# Patient Record
Sex: MALE | Race: WHITE | NOT HISPANIC OR LATINO | Employment: FULL TIME | URBAN - METROPOLITAN AREA
[De-identification: names, ages, dates, MRNs, and addresses within clinical notes are randomized per-mention and may not be internally consistent; named-entity substitution may affect disease eponyms.]

---

## 2017-07-17 ENCOUNTER — COMMUNICATION - HEALTHEAST (OUTPATIENT)
Dept: FAMILY MEDICINE | Facility: CLINIC | Age: 57
End: 2017-07-17

## 2017-07-17 DIAGNOSIS — E78.5 HYPERLIPIDEMIA: ICD-10-CM

## 2017-08-08 ENCOUNTER — COMMUNICATION - HEALTHEAST (OUTPATIENT)
Dept: FAMILY MEDICINE | Facility: CLINIC | Age: 57
End: 2017-08-08

## 2017-09-15 ENCOUNTER — COMMUNICATION - HEALTHEAST (OUTPATIENT)
Dept: TELEHEALTH | Facility: CLINIC | Age: 57
End: 2017-09-15

## 2017-09-15 ENCOUNTER — OFFICE VISIT - HEALTHEAST (OUTPATIENT)
Dept: FAMILY MEDICINE | Facility: CLINIC | Age: 57
End: 2017-09-15

## 2017-09-15 DIAGNOSIS — E06.1 SUBACUTE THYROIDITIS: ICD-10-CM

## 2017-09-15 DIAGNOSIS — Z23 NEED FOR VACCINATION: ICD-10-CM

## 2017-09-15 DIAGNOSIS — Z13.1 SCREENING FOR DIABETES MELLITUS: ICD-10-CM

## 2017-09-15 DIAGNOSIS — E78.00 PURE HYPERCHOLESTEROLEMIA: ICD-10-CM

## 2017-09-15 DIAGNOSIS — Z80.42 FAMILY HISTORY OF PROSTATE CANCER IN FATHER: ICD-10-CM

## 2017-09-15 LAB
CHOLEST SERPL-MCNC: 182 MG/DL
FASTING STATUS PATIENT QL REPORTED: YES
HDLC SERPL-MCNC: 49 MG/DL
LDLC SERPL CALC-MCNC: 114 MG/DL
PSA SERPL-MCNC: 4.7 NG/ML (ref 0–3.5)
TRIGL SERPL-MCNC: 97 MG/DL

## 2017-09-15 ASSESSMENT — MIFFLIN-ST. JEOR: SCORE: 1582.6

## 2017-09-15 NOTE — ASSESSMENT & PLAN NOTE
We will check this patient's cholesterol levels with fasting labs today.  We will run him through the cardiovascular risk algorithm.  We will consider at some point increasing her modifying his treatment to moderate intensity statin.

## 2017-09-20 ENCOUNTER — COMMUNICATION - HEALTHEAST (OUTPATIENT)
Dept: FAMILY MEDICINE | Facility: CLINIC | Age: 57
End: 2017-09-20

## 2017-09-20 DIAGNOSIS — E78.00 PURE HYPERCHOLESTEROLEMIA: ICD-10-CM

## 2017-09-20 DIAGNOSIS — R97.20 ELEVATED PSA: ICD-10-CM

## 2017-09-20 DIAGNOSIS — Z80.42 FAMILY HISTORY OF PROSTATE CANCER IN FATHER: ICD-10-CM

## 2017-09-21 ENCOUNTER — COMMUNICATION - HEALTHEAST (OUTPATIENT)
Dept: FAMILY MEDICINE | Facility: CLINIC | Age: 57
End: 2017-09-21

## 2017-10-20 ENCOUNTER — RECORDS - HEALTHEAST (OUTPATIENT)
Dept: ADMINISTRATIVE | Facility: OTHER | Age: 57
End: 2017-10-20

## 2017-11-16 ENCOUNTER — RECORDS - HEALTHEAST (OUTPATIENT)
Dept: ADMINISTRATIVE | Facility: OTHER | Age: 57
End: 2017-11-16

## 2017-11-20 ENCOUNTER — RECORDS - HEALTHEAST (OUTPATIENT)
Dept: ADMINISTRATIVE | Facility: OTHER | Age: 57
End: 2017-11-20

## 2018-04-26 ENCOUNTER — RECORDS - HEALTHEAST (OUTPATIENT)
Dept: ADMINISTRATIVE | Facility: OTHER | Age: 58
End: 2018-04-26

## 2018-04-27 LAB — PSA SERPL-MCNC: 8.2 NG/ML (ref 0–3.5)

## 2018-05-14 ENCOUNTER — RECORDS - HEALTHEAST (OUTPATIENT)
Dept: ADMINISTRATIVE | Facility: OTHER | Age: 58
End: 2018-05-14

## 2018-08-10 ENCOUNTER — AMBULATORY - HEALTHEAST (OUTPATIENT)
Dept: LAB | Facility: CLINIC | Age: 58
End: 2018-08-10

## 2018-08-10 ENCOUNTER — COMMUNICATION - HEALTHEAST (OUTPATIENT)
Dept: FAMILY MEDICINE | Facility: CLINIC | Age: 58
End: 2018-08-10

## 2018-08-10 DIAGNOSIS — R97.20 ELEVATED PROSTATE SPECIFIC ANTIGEN (PSA): ICD-10-CM

## 2018-08-10 LAB — PSA SERPL-MCNC: 5.8 NG/ML (ref 0–3.5)

## 2018-08-15 ENCOUNTER — COMMUNICATION - HEALTHEAST (OUTPATIENT)
Dept: FAMILY MEDICINE | Facility: CLINIC | Age: 58
End: 2018-08-15

## 2018-09-10 ENCOUNTER — COMMUNICATION - HEALTHEAST (OUTPATIENT)
Dept: FAMILY MEDICINE | Facility: CLINIC | Age: 58
End: 2018-09-10

## 2018-09-10 DIAGNOSIS — E78.00 PURE HYPERCHOLESTEROLEMIA: ICD-10-CM

## 2018-10-05 ENCOUNTER — OFFICE VISIT - HEALTHEAST (OUTPATIENT)
Dept: FAMILY MEDICINE | Facility: CLINIC | Age: 58
End: 2018-10-05

## 2018-10-05 ENCOUNTER — AMBULATORY - HEALTHEAST (OUTPATIENT)
Dept: LAB | Facility: CLINIC | Age: 58
End: 2018-10-05

## 2018-10-05 DIAGNOSIS — N52.9 VASCULOGENIC ERECTILE DYSFUNCTION, UNSPECIFIED VASCULOGENIC ERECTILE DYSFUNCTION TYPE: ICD-10-CM

## 2018-10-05 DIAGNOSIS — Z80.42 FAMILY HISTORY OF PROSTATE CANCER IN FATHER: ICD-10-CM

## 2018-10-05 DIAGNOSIS — Z13.1 SCREENING FOR DIABETES MELLITUS: ICD-10-CM

## 2018-10-05 DIAGNOSIS — Z00.00 ROUTINE GENERAL MEDICAL EXAMINATION AT A HEALTH CARE FACILITY: ICD-10-CM

## 2018-10-05 DIAGNOSIS — N40.1 BENIGN PROSTATIC HYPERPLASIA WITH LOWER URINARY TRACT SYMPTOMS, SYMPTOM DETAILS UNSPECIFIED: ICD-10-CM

## 2018-10-05 DIAGNOSIS — E78.00 PURE HYPERCHOLESTEROLEMIA: ICD-10-CM

## 2018-10-05 DIAGNOSIS — E06.1 SUBACUTE THYROIDITIS: ICD-10-CM

## 2018-10-05 LAB
CHOLEST SERPL-MCNC: 181 MG/DL
FASTING STATUS PATIENT QL REPORTED: YES
FASTING STATUS PATIENT QL REPORTED: YES
GLUCOSE BLD-MCNC: 85 MG/DL (ref 70–99)
HDLC SERPL-MCNC: 49 MG/DL
LDLC SERPL CALC-MCNC: 109 MG/DL
TRIGL SERPL-MCNC: 114 MG/DL
TSH SERPL DL<=0.005 MIU/L-ACNC: 3.45 UIU/ML (ref 0.3–5)

## 2018-10-05 ASSESSMENT — MIFFLIN-ST. JEOR: SCORE: 1599.94

## 2018-10-05 NOTE — ASSESSMENT & PLAN NOTE
Recheck fasting lipid levels today.  We will plan to continue simvastatin at current dose and less there is a dramatic increase in his cholesterol.

## 2018-10-05 NOTE — ASSESSMENT & PLAN NOTE
The patient continues to follow with Dr. Murray through [place] urology.  Recent PSA has improved relative to previous.  Orders are through his urologist.

## 2018-10-08 ENCOUNTER — COMMUNICATION - HEALTHEAST (OUTPATIENT)
Dept: FAMILY MEDICINE | Facility: CLINIC | Age: 58
End: 2018-10-08

## 2019-04-26 ENCOUNTER — AMBULATORY - HEALTHEAST (OUTPATIENT)
Dept: LAB | Facility: CLINIC | Age: 59
End: 2019-04-26

## 2019-04-26 DIAGNOSIS — R97.20 ELEVATED PROSTATE SPECIFIC ANTIGEN (PSA): ICD-10-CM

## 2019-04-26 LAB — PSA SERPL-MCNC: 5.8 NG/ML (ref 0–3.5)

## 2019-04-29 ENCOUNTER — COMMUNICATION - HEALTHEAST (OUTPATIENT)
Dept: FAMILY MEDICINE | Facility: CLINIC | Age: 59
End: 2019-04-29

## 2019-11-12 ENCOUNTER — COMMUNICATION - HEALTHEAST (OUTPATIENT)
Dept: FAMILY MEDICINE | Facility: CLINIC | Age: 59
End: 2019-11-12

## 2019-11-12 DIAGNOSIS — E78.00 PURE HYPERCHOLESTEROLEMIA: ICD-10-CM

## 2020-02-06 ENCOUNTER — COMMUNICATION - HEALTHEAST (OUTPATIENT)
Dept: FAMILY MEDICINE | Facility: CLINIC | Age: 60
End: 2020-02-06

## 2020-02-06 DIAGNOSIS — E78.00 PURE HYPERCHOLESTEROLEMIA: ICD-10-CM

## 2020-02-06 NOTE — ASSESSMENT & PLAN NOTE
Clinic visit required before additional refills.  Patient has not been seen since 2018.  90-day supply sent to pharmacy.

## 2020-02-21 ENCOUNTER — OFFICE VISIT - HEALTHEAST (OUTPATIENT)
Dept: FAMILY MEDICINE | Facility: CLINIC | Age: 60
End: 2020-02-21

## 2020-02-21 DIAGNOSIS — E06.1 SUBACUTE THYROIDITIS: ICD-10-CM

## 2020-02-21 DIAGNOSIS — E78.00 PURE HYPERCHOLESTEROLEMIA: ICD-10-CM

## 2020-02-21 DIAGNOSIS — Z11.4 SCREENING FOR HIV WITHOUT PRESENCE OF RISK FACTORS: ICD-10-CM

## 2020-02-21 DIAGNOSIS — Z11.59 ENCOUNTER FOR HEPATITIS C SCREENING TEST FOR LOW RISK PATIENT: ICD-10-CM

## 2020-02-21 DIAGNOSIS — Z13.1 SCREENING FOR DIABETES MELLITUS: ICD-10-CM

## 2020-02-21 DIAGNOSIS — Z00.00 ROUTINE GENERAL MEDICAL EXAMINATION AT A HEALTH CARE FACILITY: ICD-10-CM

## 2020-02-21 DIAGNOSIS — Z80.42 FAMILY HISTORY OF PROSTATE CANCER IN FATHER: ICD-10-CM

## 2020-02-21 LAB
ALT SERPL W P-5'-P-CCNC: 26 U/L (ref 0–45)
ANION GAP SERPL CALCULATED.3IONS-SCNC: 9 MMOL/L (ref 5–18)
BUN SERPL-MCNC: 20 MG/DL (ref 8–22)
CALCIUM SERPL-MCNC: 9.2 MG/DL (ref 8.5–10.5)
CHLORIDE BLD-SCNC: 103 MMOL/L (ref 98–107)
CHOLEST SERPL-MCNC: 165 MG/DL
CO2 SERPL-SCNC: 27 MMOL/L (ref 22–31)
CREAT SERPL-MCNC: 0.9 MG/DL (ref 0.7–1.3)
FASTING STATUS PATIENT QL REPORTED: YES
GFR SERPL CREATININE-BSD FRML MDRD: >60 ML/MIN/1.73M2
GLUCOSE BLD-MCNC: 83 MG/DL (ref 70–125)
HDLC SERPL-MCNC: 43 MG/DL
HIV 1+2 AB+HIV1 P24 AG SERPL QL IA: NEGATIVE
LDLC SERPL CALC-MCNC: 104 MG/DL
POTASSIUM BLD-SCNC: 4.1 MMOL/L (ref 3.5–5)
SODIUM SERPL-SCNC: 139 MMOL/L (ref 136–145)
TRIGL SERPL-MCNC: 91 MG/DL
TSH SERPL DL<=0.005 MIU/L-ACNC: 1.98 UIU/ML (ref 0.3–5)

## 2020-02-21 ASSESSMENT — MIFFLIN-ST. JEOR: SCORE: 1648.14

## 2020-02-21 NOTE — ASSESSMENT & PLAN NOTE
Doing well.  Physically active.  He will continue to see his urologist.  Some post void dribbling.  Check lipids and labs today.  Screening for hepatitis C and HIV.

## 2020-02-24 LAB — HCV AB SERPL QL IA: NEGATIVE

## 2020-02-26 ENCOUNTER — RECORDS - HEALTHEAST (OUTPATIENT)
Dept: ADMINISTRATIVE | Facility: OTHER | Age: 60
End: 2020-02-26

## 2020-04-14 ENCOUNTER — COMMUNICATION - HEALTHEAST (OUTPATIENT)
Dept: FAMILY MEDICINE | Facility: CLINIC | Age: 60
End: 2020-04-14

## 2020-04-14 DIAGNOSIS — E78.00 PURE HYPERCHOLESTEROLEMIA: ICD-10-CM

## 2020-07-18 ENCOUNTER — COMMUNICATION - HEALTHEAST (OUTPATIENT)
Dept: FAMILY MEDICINE | Facility: CLINIC | Age: 60
End: 2020-07-18

## 2020-07-18 DIAGNOSIS — E78.00 PURE HYPERCHOLESTEROLEMIA: ICD-10-CM

## 2021-04-12 ENCOUNTER — COMMUNICATION - HEALTHEAST (OUTPATIENT)
Dept: FAMILY MEDICINE | Facility: CLINIC | Age: 61
End: 2021-04-12

## 2021-04-12 DIAGNOSIS — E78.00 PURE HYPERCHOLESTEROLEMIA: ICD-10-CM

## 2021-04-13 RX ORDER — SIMVASTATIN 40 MG
TABLET ORAL
Qty: 90 TABLET | Refills: 0 | Status: SHIPPED | OUTPATIENT
Start: 2021-04-13 | End: 2021-07-29

## 2021-04-19 ENCOUNTER — AMBULATORY - HEALTHEAST (OUTPATIENT)
Dept: NURSING | Facility: CLINIC | Age: 61
End: 2021-04-19

## 2021-05-10 ENCOUNTER — AMBULATORY - HEALTHEAST (OUTPATIENT)
Dept: NURSING | Facility: CLINIC | Age: 61
End: 2021-05-10

## 2021-05-25 ENCOUNTER — RECORDS - HEALTHEAST (OUTPATIENT)
Dept: ADMINISTRATIVE | Facility: CLINIC | Age: 61
End: 2021-05-25

## 2021-05-31 VITALS — HEIGHT: 69 IN | WEIGHT: 175.3 LBS | BODY MASS INDEX: 25.96 KG/M2

## 2021-06-02 VITALS — WEIGHT: 180 LBS | HEIGHT: 68 IN | BODY MASS INDEX: 27.28 KG/M2

## 2021-06-03 NOTE — TELEPHONE ENCOUNTER
RN cannot approve Refill Request    RN can NOT refill this medication PCP messaged that patient is overdue for Office Visit. Last office visit: 9/15/2017 Pipo Helm MD Last Physical: 10/5/2018 Last MTM visit: Visit date not found Last visit same specialty: 9/15/2017 Pipo Helm MD.  Next visit within 3 mo: Visit date not found  Next physical within 3 mo: Visit date not found      Shelia Mari, [facility] Triage/Med Refill 11/12/2019    Requested Prescriptions   Pending Prescriptions Disp Refills     simvastatin (ZOCOR) 40 MG tablet [Pharmacy Med Name: SIMVASTATIN 40 MG TABLET] 90 tablet 3     Sig: TAKE 1 TABLET BY MOUTH EVERYDAY AT BEDTIME       Statins Refill Protocol (Hmg CoA Reductase Inhibitors) Failed - 11/12/2019  2:01 AM        Failed - PCP or prescribing provider visit in past 12 months      Last office visit with prescriber/PCP: 9/15/2017 Pipo Helm MD OR same dept: Visit date not found OR same specialty: 9/15/2017 Pipo Helm MD  Last physical: 10/5/2018 Last MTM visit: Visit date not found   Next visit within 3 mo: Visit date not found  Next physical within 3 mo: Visit date not found  Prescriber OR PCP: Pipo Helm MD  Last diagnosis associated with med order: 1. Pure hypercholesterolemia  - simvastatin (ZOCOR) 40 MG tablet [Pharmacy Med Name: SIMVASTATIN 40 MG TABLET]; TAKE 1 TABLET BY MOUTH EVERYDAY AT BEDTIME  Dispense: 90 tablet; Refill: 3    If protocol passes may refill for 12 months if within 3 months of last provider visit (or a total of 15 months).

## 2021-06-04 VITALS
RESPIRATION RATE: 12 BRPM | DIASTOLIC BLOOD PRESSURE: 60 MMHG | SYSTOLIC BLOOD PRESSURE: 116 MMHG | HEIGHT: 69 IN | HEART RATE: 64 BPM | WEIGHT: 188 LBS | TEMPERATURE: 97.6 F | BODY MASS INDEX: 27.85 KG/M2 | OXYGEN SATURATION: 98 %

## 2021-06-05 NOTE — TELEPHONE ENCOUNTER
Pure hypercholesterolemia  Clinic visit required before additional refills.  Patient has not been seen since 2018.  90-day supply sent to pharmacy.

## 2021-06-05 NOTE — TELEPHONE ENCOUNTER
RN cannot approve Refill Request    RN can NOT refill this medication Protocol failed and NO refill given.      Megan Mishra, [facility] Triage/Med Refill 2/6/2020    Requested Prescriptions   Pending Prescriptions Disp Refills     simvastatin (ZOCOR) 40 MG tablet [Pharmacy Med Name: SIMVASTATIN 40 MG TABLET] 90 tablet 3     Sig: TAKE 1 TABLET BY MOUTH EVERYDAY AT BEDTIME       Statins Refill Protocol (Hmg CoA Reductase Inhibitors) Failed - 2/6/2020  4:33 AM        Failed - PCP or prescribing provider visit in past 12 months      Last office visit with prescriber/PCP: 9/15/2017 Pipo Helm MD OR same dept: Visit date not found OR same specialty: 9/15/2017 Pipo Helm MD  Last physical: 10/5/2018 Last MTM visit: Visit date not found   Next visit within 3 mo: Visit date not found  Next physical within 3 mo: Visit date not found  Prescriber OR PCP: Pipo Helm MD  Last diagnosis associated with med order: 1. Pure hypercholesterolemia  - simvastatin (ZOCOR) 40 MG tablet [Pharmacy Med Name: SIMVASTATIN 40 MG TABLET]; TAKE 1 TABLET BY MOUTH EVERYDAY AT BEDTIME  Dispense: 90 tablet; Refill: 0    If protocol passes may refill for 12 months if within 3 months of last provider visit (or a total of 15 months).

## 2021-06-07 NOTE — TELEPHONE ENCOUNTER
Refill Request  Did you contact pharmacy: Yes, needs to change pharmacy to [facility] pharmacy in [place]  Medication name:   simvastatin (ZOCOR) 40 MG tablet  90 tablet  0  2/6/2020      Sig: TAKE 1 TABLET BY MOUTH EVERYDAY AT BEDTIME     Sent to pharmacy as: simvastatin 40 mg tablet (ZOCOR)     E-Prescribing Status: Receipt confirmed by pharmacy (2/6/2020  9:47 AM CST)         Who prescribed the medication: Pipo Helm MD   Requested Pharmacy: [facility] in [place]  Is patient out of medication: Yes  Patient notified refills processed in 3 business days:  yes  Okay to leave a detailed message: yes

## 2021-06-09 NOTE — TELEPHONE ENCOUNTER
Refill Approved    Rx renewed per Medication Renewal Policy. Medication was last renewed on 04/15/2020.  Last office visit was 02/21/2020 with PCP.    Cait Leon, [facility] Triage/Med Refill 7/18/2020     Requested Prescriptions   Pending Prescriptions Disp Refills     simvastatin (ZOCOR) 40 MG tablet [Pharmacy Med Name: Simvastatin Oral Tablet 40 MG] 90 tablet 0     Sig: TAKE 1 TABLET BY MOUTH EVERYDAY AT BEDTIME       Statins Refill Protocol (Hmg CoA Reductase Inhibitors) Passed - 7/18/2020  8:26 AM        Passed - PCP or prescribing provider visit in past 12 months      Last office visit with prescriber/PCP: 9/15/2017 Pipo Helm MD OR same dept: Visit date not found OR same specialty: 9/15/2017 Pipo Helm MD  Last physical: 2/21/2020 Last MTM visit: Visit date not found   Next visit within 3 mo: Visit date not found  Next physical within 3 mo: Visit date not found  Prescriber OR PCP: Pipo Helm MD  Last diagnosis associated with med order: 1. Pure hypercholesterolemia  - simvastatin (ZOCOR) 40 MG tablet [Pharmacy Med Name: Simvastatin Oral Tablet 40 MG]; TAKE 1 TABLET BY MOUTH EVERYDAY AT BEDTIME  Dispense: 90 tablet; Refill: 0    If protocol passes may refill for 12 months if within 3 months of last provider visit (or a total of 15 months).

## 2021-06-13 NOTE — PROGRESS NOTES
Assessment/Plan:    Problem List Items Addressed This Visit        ENT/CARD/PULM/ENDO Problems    Pure hypercholesterolemia     We will check this patient's cholesterol levels with fasting labs today.  We will run him through the cardiovascular risk algorithm.  We will consider at some point increasing her modifying his treatment to moderate intensity statin.         Relevant Orders    Lipid Profile    Subacute thyroiditis - Primary     Asymptomatic.  The patient did unexpectedly lose weight.  Will check a thyroid level today.         Relevant Orders    Thyroid Almo       Other    Family history of prostate cancer in father     We will continue the practice of following his PSA.  No new lower urinary tract symptoms.         Relevant Orders    PSA, Annual Screen (Prostatic-Specific Antigen)      Other Visit Diagnoses     Need for vaccination        Screening for diabetes mellitus        Relevant Orders    Glucose        Greater than 25 minutes of total time was spent with patient of which greater than 50% was spent on counseling and coordination of care.        Pipo Helm MD  _______________________________    Chief Complaint   Patient presents with     Establish Care     Med Check     Subjective: Jamel Hernandez is a 57 y.o. year old male who returns to clinic for the following chronic complaints/concerns:     Establish care:   -This patient is here for medication check and to establish care.  He is a long-time patient of Dr. Varner and has not been seen in clinic for over a year since Dr. Varner's senior living.  He has been on simvastatin for a number of years without side effects.  He has a distant history of thyroiditis which reportedly resolved.  He otherwise feels well.  He denies chest pain, chest tightness, shortness of breath, difficulty breathing.  Overall, the patient feels energetic.  He has lost some weight over the course of the summer which he attributes to bicycle riding although he  "was surprised by the measurement.  No other concerns today.    Review of systems is negative except for as shown in the HPI.    The following portions of the patient's history were reviewed and updated as appropriate: allergies, current medications, past family history, past medical history, past social history, past surgical history and problem list.    Objective:    height is 5' 8.5\" (1.74 m) and weight is 175 lb 4.8 oz (79.5 kg). His blood pressure is 132/80 and his pulse is 76. His respiration is 14.   General: No acute distress  ENT: No submandibular anterior cervical lymphadenopathy.  No thyromegaly.  Cardiac: Regular rate and rhythm, normal S1/S2, no murmurs rubs gallops  Respiratory: Clear to auscultation bilaterally.  Abdomen: Soft, nontender, nondistended.  Psych: Normal affect.  Normal rate of speech.  No tangentiality.    No results found for this or any previous visit (from the past 24 hour(s)).  The patient and I reviewed his laboratory tests over the past year.  His PSA was 3.8 a couple of years ago.  He does have a family history of prostate cancer in his father and his grandfather.  The patient's lipid levels have slowly been increasing.  It appears that his diagnosis was in 2012 with a very mild elevation.  The patient's colonoscopy was completed in 2016.  Is a 10 year repeat interval.    Additional History from Old Records Summarized (2): no  Decision to Obtain Records (1): no  Radiology Tests Summarized or Ordered (1): no  Labs Reviewed or Ordered (1): yes  Medicine Test Summarized or Ordered (1): yes  Independent Review of EKG or X-RAY(2 each): no    This note has been dictated using voice recognition software. Any grammatical or context distortions are unintentional and inherent to the software  "

## 2021-06-16 PROBLEM — Z80.42 FAMILY HISTORY OF PROSTATE CANCER IN FATHER: Status: ACTIVE | Noted: 2017-09-15

## 2021-06-16 PROBLEM — Z00.00 ROUTINE GENERAL MEDICAL EXAMINATION AT A HEALTH CARE FACILITY: Status: ACTIVE | Noted: 2020-02-21

## 2021-06-16 PROBLEM — N52.9 VASCULOGENIC ERECTILE DYSFUNCTION: Status: ACTIVE | Noted: 2018-10-05

## 2021-06-19 NOTE — LETTER
Letter by Pipo Helm MD at      Author: Pipo Helm MD Service: -- Author Type: --    Filed:  Encounter Date: 11/12/2019 Status: Signed       Jamel Hernandez   [address] [facility] [address]      11/12/2019       Dear Jamel,       In reviewing your records, we have determined a gap in your preventive services. Based on your age and health history, we recommend the follow service.     ? General Physical        If you have had the service elsewhere, please contact us so we can update our records. Please let us know if you have transferred your care to another clinic.    Please call [phone] to schedule this appointment.    We believe that a strong preventive care program, including regular physicals and follow-up care is an important part of a healthy lifestyle and we are committed to helping you maintain your health.    Thank you for choosing us as your health care provider.    Sincerely,     [facility]            
Intractable abdominal pain

## 2021-06-19 NOTE — LETTER
Letter by Pipo Helm MD at      Author: Pipo Helm MD Service: -- Author Type: --    Filed:  Encounter Date: 4/29/2019 Status: (Other)         Jamel Hernandez  [address] [facility] [address]             April 29, 2019         Dear Mr. Hernandez,    Below are the results from your recent visit:    Resulted Orders   PSA, Diagnostic (Prostatic-Specific Antigen)   Result Value Ref Range    PSA 5.8 (H) 0.0 - 3.5 ng/mL    Narrative    Method is Abbott Prostate-Specific Antigen (PSA)  Standard-WHO 1st International (90:10)       This result was forwarded to your urologist.    Please call with questions or contact us using [place].    Sincerely,        Electronically signed by Pipo Helm MD

## 2021-06-20 NOTE — PROGRESS NOTES
MALE PREVENTATIVE EXAM    Assessment and Plan:       Problem List Items Addressed This Visit        ENT/CARD/PULM/ENDO Problems    Pure hypercholesterolemia     Recheck fasting lipid levels today.  We will plan to continue simvastatin at current dose and less there is a dramatic increase in his cholesterol.         Relevant Medications    simvastatin (ZOCOR) 40 MG tablet    Other Relevant Orders    Lipid Profile    Subacute thyroiditis     Recheck TSH.  If normal, discontinue annual measurement unless he becomes symptomatic.         Relevant Orders    Thyroid Edwards       Other    Family history of prostate cancer in father     The patient continues to follow with Dr. Murray through [place] urology.  Recent PSA has improved relative to previous.  Orders are through his urologist.         Vasculogenic erectile dysfunction     Continue sildenafil.  This is currently being prescribed through his urologist.           Other Visit Diagnoses     Routine general medical examination at a health care facility    -  Primary    Relevant Orders    Lipid Profile    Glucose (Completed)    Thyroid Edwards    Benign prostatic hyperplasia with lower urinary tract symptoms, symptom details unspecified        Screening for diabetes mellitus        Relevant Orders    Glucose (Completed)        Next follow up:  Return in about 1 year (around 10/5/2019) for Annual physical.    Immunization Review  Adult Imm Review: Due today, orders placed flu shot   Pt does not use tobacco products   I discussed the following with the patient:   Adult Healthy Living: Importance of regular exercise  Healthy nutrition  Herbal medications/alternative medical therapies    I have had an Advance Directives discussion with the patient.    Subjective:   Chief Complaint: Jamel Hernandez is an 58 y.o. male here for a preventative health visit.     HPI:  Exercise.  More bike riding this past summer.  Planning to cross country ski.  Stamina great.   "Continue to follow-up     Healthy Habits  Are you taking a daily aspirin? No  Do you typically exercising at least 40 min, 3-4 times per week?  Yes  Do you usually eat at least 4 servings of fruit and vegetables a day, include whole grains and fiber and avoid regularly eating high fat foods? NO  Have you had an eye exam in the past two years? NO  Do you see a dentist twice per year? Yes  Do you have any concerns regarding sleep? No    Safety Screen  If you own firearms, are they secured in a locked gun cabinet or with trigger locks? The patient does not own any firearms  Do you feel you are safe where you are living?: Yes (10/5/2018 11:22 AM)  Do you feel you are safe in your relationship(s)?: Yes (10/5/2018 11:22 AM)    Review of Systems:  Please see above.  The rest of the review of systems are negative for all systems.     Cancer Screening       Status Date      COLONOSCOPY Next Due 6/6/2026      Done 6/6/2016 COLONOSCOPY EXTERNAL RESULT        Patient Care Team:  Pipo Helm MD as PCP - General (Family Medicine)    History     Reviewed By Date/Time Sections Reviewed    Pipo Helm MD 10/5/2018 11:31 AM Medical, Surgical, Tobacco, Alcohol, Drug Use, Sexual Activity, Family    Jewels Richardson LPN 10/5/2018 11:22 AM Tobacco    Jewels Richardson LPN 10/5/2018 11:19 AM Surgical, Tobacco, Family    Jewels Richardson LPN 10/5/2018 11:18 AM Surgical, Tobacco, Alcohol, Drug Use, Sexual Activity, Family          Objective:   Vital Signs:   Visit Vitals     /78 (Patient Site: Left Arm, Patient Position: Sitting, Cuff Size: Adult Regular)     Pulse 60     Temp 97.6  F (36.4  C) (Oral)     Resp 20     Ht 5' 8.25\" (1.734 m)     Wt 180 lb (81.6 kg)     SpO2 98%  Comment: room air     BMI 27.17 kg/m2       PHYSICAL EXAM  Physical Exam   Constitutional: He is oriented to person, place, and time. He appears well-developed. No distress.   HENT:   Head: Normocephalic and atraumatic.   Right " Ear: External ear normal.   Left Ear: External ear normal.   Nose: Nose normal.   Mouth/Throat: Oropharynx is clear and moist. No oropharyngeal exudate.   Eyes: Conjunctivae and EOM are normal. Pupils are equal, round, and reactive to light. Right eye exhibits no discharge. Left eye exhibits no discharge. No scleral icterus.   Neck: Normal range of motion. No thyromegaly present.   Cardiovascular: Normal rate, regular rhythm, normal heart sounds and intact distal pulses.  Exam reveals no gallop and no friction rub.    No murmur heard.  Pulmonary/Chest: Effort normal and breath sounds normal. No respiratory distress. He has no wheezes.   Abdominal: Soft. He exhibits no distension and no mass. There is no tenderness.   Musculoskeletal: Normal range of motion. He exhibits no edema.   Lymphadenopathy:     He has no cervical adenopathy.   Neurological: He is alert and oriented to person, place, and time. He has normal reflexes. No cranial nerve deficit. He exhibits normal muscle tone.   Skin: Skin is warm.   Psychiatric: He has a normal mood and affect. His behavior is normal. Judgment and thought content normal.   Vitals reviewed.    The 10-year ASCVD risk score ([place] DC Jr, et al., 2013) is: 7.3%    Values used to calculate the score:      Age: 58 years      Sex: Male      Is Non- : No      Diabetic: No      Tobacco smoker: No      Systolic Blood Pressure: 134 mmHg      Is BP treated: No      HDL Cholesterol: 49 mg/dL      Total Cholesterol: 182 mg/dL         Medication List          These changes are accurate as of 10/5/18  1:41 PM.  If you have any questions, ask your nurse or doctor.               CHANGE how you take these medications          simvastatin 40 MG tablet   Also known as:  ZOCOR   INSTRUCTIONS:  Take 1 tablet (40 mg total) by mouth at bedtime.   What changed:  See the new instructions.   Changed by:  Pipo Helm MD             CONTINUE taking these medications           sildenafil (antihypertensive) 20 mg tablet   Also known as:  REVATIO   INSTRUCTIONS:  Take 5 tablets (100 mg total) by mouth as needed. 1 hour (range 0.5 to 4 hours) prior to sexual activity                Where to Get Your Medications      These medications were sent to [place] [id number] [place] [facility] [place], [place] - [address]  [address] [facility] [id number]     Phone:  [phone]      simvastatin 40 MG tablet             Additional Screenings Completed Today:

## 2021-06-28 NOTE — PROGRESS NOTES
Progress Notes by Pipo Helm MD at 2/21/2020  1:40 PM     Author: Pipo Helm MD Service: -- Author Type: Physician    Filed: 2/21/2020  2:27 PM Encounter Date: 2/21/2020 Status: Signed    : Pipo Helm MD (Physician)       MALE PREVENTATIVE EXAM    Assessment and Plan:       Problem List Items Addressed This Visit     Pure hypercholesterolemia    Relevant Orders    Lipid Profile    Basic Metabolic Panel    ALT (SGPT)    BMI 27.0-27.9,adult    Relevant Orders    Thyroid Stimulating Hormone (TSH)    Subacute thyroiditis    Relevant Orders    Thyroid Stimulating Hormone (TSH)    Family history of prostate cancer in father    Routine general medical examination at a health care facility - Primary     Doing well.  Physically active.  He will continue to see his urologist.  Some post void dribbling.  Check lipids and labs today.  Screening for hepatitis C and HIV.           Relevant Orders    Thyroid Stimulating Hormone (TSH)      Other Visit Diagnoses     Screening for HIV without presence of risk factors        Relevant Orders    HIV Antigen/Antibody Screening Cascade    Encounter for hepatitis C screening test for low risk patient        Relevant Orders    Hepatitis C Antibody (Anti-HCV)    Screening for diabetes mellitus        Relevant Orders    Glucose          Next follow up:  Return in about 1 year (around 2/21/2021) for Annual physical.    Immunization Review  Adult Imm Review: No immunizations due today  Pt does not use tobacco products     I discussed the following with the patient:   Adult Healthy Living: Importance of regular exercise  Healthy nutrition  Getting adequate sleep  Stress management  Herbal medications/alternative medical therapies    I have had an Advance Directives discussion with the patient.    Subjective:   Chief Complaint: Jamel Hernandez is an 59 y.o. male here for a preventative health visit.     HPI:      Continues to mountain bike and cross country ski.   "Continues to work long hours at work.  Enjoys swimming.      Healthy Habits  Are you taking a daily aspirin? No  Do you typically exercising at least 40 min, 3-4 times per week?  Yes  Do you usually eat at least 4 servings of fruit and vegetables a day, include whole grains and fiber and avoid regularly eating high fat foods? NO.  He says that he needs to have a \"better diet.\"  Some overeating.  He eats 2+ per day.  He eats an early lunch.   Have you had an eye exam in the past two years? Yes  Do you see a dentist twice per year? Yes  Do you have any concerns regarding sleep? No    Safety Screen  If you own firearms, are they secured in a locked gun cabinet or with trigger locks? The patient does not own any firearms  Do you feel you are safe where you are living?: Yes (2/21/2020  1:45 PM)  Do you feel you are safe in your relationship(s)?: Yes (2/21/2020  1:45 PM)      Review of Systems:  Please see above.  The rest of the review of systems are negative for all systems.     Cancer Screening       Status Date      COLONOSCOPY Next Due 6/6/2026      Done 6/6/2016 COLONOSCOPY EXTERNAL RESULT          Patient Care Team:  Pipo Helm MD as PCP - General (Family Medicine)  Pipo Helm MD as Assigned PCP  Ok Taveras MD as Physician (Urology)        History     Reviewed By Date/Time Sections Reviewed    Pipo Helm MD 2/21/2020  2:12 PM Surgical, Pipo Gil MD 2/21/2020  2:09 PM Medical, Surgical, Tobacco, Jewels Ryan LPN 2/21/2020  1:49 PM Jewels Ryan LPN 2/21/2020  1:48 PM Surgical, Tobacco, Alcohol, Drug Use, Sexual Activity, Jewels Ryan LPN 2/21/2020  1:47 PM Jewels Irizarry LPN 2/21/2020  1:45 PM Jewels Irizarry LPN 2/21/2020  1:44 PM Tobacco          Objective:   Vital Signs:   Visit Vitals  /60 (Patient Site: Left Arm, Patient Position: Sitting, Cuff Size: " "Adult Large)   Pulse 64   Temp 97.6  F (36.4  C) (Oral)   Resp 12   Ht 5' 9\" (1.753 m) Comment: with shoes   Wt 188 lb (85.3 kg)   SpO2 98% Comment: room air   BMI 27.76 kg/m      PHYSICAL EXAM  Physical Exam  Vitals signs reviewed.   Constitutional:       General: He is not in acute distress.     Appearance: He is well-developed.   HENT:      Head: Normocephalic and atraumatic.      Right Ear: External ear normal.      Left Ear: External ear normal.      Nose: Nose normal.      Mouth/Throat:      Pharynx: No oropharyngeal exudate.   Eyes:      General: No scleral icterus.        Right eye: No discharge.         Left eye: No discharge.      Conjunctiva/sclera: Conjunctivae normal.      Pupils: Pupils are equal, round, and reactive to light.   Neck:      Musculoskeletal: Normal range of motion.      Thyroid: No thyromegaly.   Cardiovascular:      Rate and Rhythm: Normal rate and regular rhythm.      Heart sounds: Normal heart sounds. No murmur. No friction rub. No gallop.    Pulmonary:      Effort: Pulmonary effort is normal. No respiratory distress.      Breath sounds: Normal breath sounds. No wheezing.   Abdominal:      General: There is no distension.      Palpations: Abdomen is soft. There is no mass.      Tenderness: There is no abdominal tenderness.   Musculoskeletal: Normal range of motion.   Lymphadenopathy:      Cervical: No cervical adenopathy.   Skin:     General: Skin is warm.   Neurological:      Mental Status: He is alert and oriented to person, place, and time.      Cranial Nerves: No cranial nerve deficit.      Motor: No abnormal muscle tone.      Deep Tendon Reflexes: Reflexes are normal and symmetric.   Psychiatric:         Behavior: Behavior normal.         Thought Content: Thought content normal.         Judgment: Judgment normal.       The 10-year ASCVD risk score (Antonellatommy NICHOLE Jr., et al., 2013) is: 6.2%    Values used to calculate the score:      Age: 59 years      Sex: Male      Is Non- " : No      Diabetic: No      Tobacco smoker: No      Systolic Blood Pressure: 116 mmHg      Is BP treated: No      HDL Cholesterol: 49 mg/dL      Total Cholesterol: 181 mg/dL         Medication List          Accurate as of February 21, 2020  2:27 PM. If you have any questions, ask your nurse or doctor.            CONTINUE taking these medications    simvastatin 40 MG tablet  Also known as:  ZOCOR  INSTRUCTIONS:  TAKE 1 TABLET BY MOUTH EVERYDAY AT BEDTIME           STOP taking these medications    sildenafil 20 mg tablet  Also known as:  REVATIO  Stopped by:  Pipo Helm MD            Additional Screenings Completed Today:

## 2021-07-03 NOTE — ADDENDUM NOTE
Addendum Note by Hallie Jennings CMA at 2/19/2016  5:03 PM     Author: Hallie Jennings CMA Service: -- Author Type: Certified Medical Assistant    Filed: 2/26/2020  3:47 PM Encounter Date: 2/19/2016 Status: Signed    : Hallie Jennings CMA (Certified Medical Assistant)    Addended by: HALLIE JENNINGS on: 2/26/2020 03:47 PM        Modules accepted: Orders

## 2021-07-03 NOTE — ADDENDUM NOTE
Addendum Note by Hallie Jennings CMA at 4/26/2018  8:44 AM     Author: Hallie Jennings CMA Service: -- Author Type: Certified Medical Assistant    Filed: 4/26/2018  8:44 AM Encounter Date: 2/19/2016 Status: Signed    : Hallie Jennings CMA (Certified Medical Assistant)    Addended by: HALLIE JENNINGS on: 4/26/2018 08:44 AM        Modules accepted: Orders

## 2021-07-29 ENCOUNTER — MYC REFILL (OUTPATIENT)
Dept: FAMILY MEDICINE | Facility: CLINIC | Age: 61
End: 2021-07-29

## 2021-07-29 DIAGNOSIS — E78.00 PURE HYPERCHOLESTEROLEMIA: ICD-10-CM

## 2021-08-01 ENCOUNTER — HEALTH MAINTENANCE LETTER (OUTPATIENT)
Age: 61
End: 2021-08-01

## 2021-08-03 RX ORDER — SIMVASTATIN 40 MG
40 TABLET ORAL AT BEDTIME
Qty: 90 TABLET | Refills: 0 | Status: SHIPPED | OUTPATIENT
Start: 2021-08-03 | End: 2023-03-03

## 2021-08-03 NOTE — TELEPHONE ENCOUNTER
"Routing refill request to provider for review/approval because:  Labs not current:  LDL  Patient needs to be seen because it has been more than 1 year since last office visit.    Last Written Prescription Date:  4/13/21  Last Fill Quantity: 90,  # refills: 0   Last office visit provider:  2/21/20     Requested Prescriptions   Pending Prescriptions Disp Refills     simvastatin (ZOCOR) 40 MG tablet 90 tablet 0       Statins Protocol Failed - 7/29/2021  6:22 PM        Failed - LDL on file in past 12 months     Recent Labs   Lab Test 02/21/20  1355                Failed - Recent (12 mo) or future (30 days) visit within the authorizing provider's specialty     Patient has had an office visit with the authorizing provider or a provider within the authorizing providers department within the previous 12 mos or has a future within next 30 days. See \"Patient Info\" tab in inbasket, or \"Choose Columns\" in Meds & Orders section of the refill encounter.              Passed - No abnormal creatine kinase in past 12 months     No lab results found.             Passed - Medication is active on med list        Passed - Patient is age 18 or older             adeel aparicio RN 08/02/21 8:06 PM  "

## 2021-09-24 ENCOUNTER — OFFICE VISIT (OUTPATIENT)
Dept: FAMILY MEDICINE | Facility: CLINIC | Age: 61
End: 2021-09-24
Payer: COMMERCIAL

## 2021-09-24 VITALS
WEIGHT: 176 LBS | HEIGHT: 68 IN | RESPIRATION RATE: 16 BRPM | HEART RATE: 80 BPM | TEMPERATURE: 98.2 F | SYSTOLIC BLOOD PRESSURE: 112 MMHG | DIASTOLIC BLOOD PRESSURE: 60 MMHG | OXYGEN SATURATION: 98 % | BODY MASS INDEX: 26.67 KG/M2

## 2021-09-24 DIAGNOSIS — N52.9 VASCULOGENIC ERECTILE DYSFUNCTION, UNSPECIFIED VASCULOGENIC ERECTILE DYSFUNCTION TYPE: ICD-10-CM

## 2021-09-24 DIAGNOSIS — R06.83 SNORES: ICD-10-CM

## 2021-09-24 DIAGNOSIS — Z00.00 ROUTINE GENERAL MEDICAL EXAMINATION AT A HEALTH CARE FACILITY: Primary | ICD-10-CM

## 2021-09-24 DIAGNOSIS — E78.00 PURE HYPERCHOLESTEROLEMIA: ICD-10-CM

## 2021-09-24 DIAGNOSIS — R97.20 ELEVATED PROSTATE SPECIFIC ANTIGEN (PSA): ICD-10-CM

## 2021-09-24 DIAGNOSIS — Z12.5 SCREENING FOR PROSTATE CANCER: ICD-10-CM

## 2021-09-24 DIAGNOSIS — Z13.1 SCREENING FOR DIABETES MELLITUS: ICD-10-CM

## 2021-09-24 LAB
CHOLEST SERPL-MCNC: 190 MG/DL
FASTING STATUS PATIENT QL REPORTED: YES
FASTING STATUS PATIENT QL REPORTED: YES
GLUCOSE BLD-MCNC: 83 MG/DL (ref 70–125)
HDLC SERPL-MCNC: 52 MG/DL
LDLC SERPL CALC-MCNC: 125 MG/DL
PSA SERPL-MCNC: 6.55 UG/L (ref 0–4.5)
TRIGL SERPL-MCNC: 65 MG/DL

## 2021-09-24 PROCEDURE — 99396 PREV VISIT EST AGE 40-64: CPT | Mod: 25 | Performed by: FAMILY MEDICINE

## 2021-09-24 PROCEDURE — G0103 PSA SCREENING: HCPCS | Performed by: FAMILY MEDICINE

## 2021-09-24 PROCEDURE — 80061 LIPID PANEL: CPT | Performed by: FAMILY MEDICINE

## 2021-09-24 PROCEDURE — 90471 IMMUNIZATION ADMIN: CPT | Performed by: FAMILY MEDICINE

## 2021-09-24 PROCEDURE — 82947 ASSAY GLUCOSE BLOOD QUANT: CPT | Performed by: FAMILY MEDICINE

## 2021-09-24 PROCEDURE — 36415 COLL VENOUS BLD VENIPUNCTURE: CPT | Performed by: FAMILY MEDICINE

## 2021-09-24 PROCEDURE — 90682 RIV4 VACC RECOMBINANT DNA IM: CPT | Performed by: FAMILY MEDICINE

## 2021-09-24 ASSESSMENT — ENCOUNTER SYMPTOMS
FEVER: 0
PARESTHESIAS: 0
HEARTBURN: 0
DIARRHEA: 0
DIZZINESS: 0
EYE PAIN: 0
HEMATURIA: 0
MYALGIAS: 0
FREQUENCY: 0
WEAKNESS: 0
CONSTIPATION: 0
SHORTNESS OF BREATH: 0
ARTHRALGIAS: 0
ABDOMINAL PAIN: 0
JOINT SWELLING: 0
HEADACHES: 0
DYSURIA: 0
NAUSEA: 0
PALPITATIONS: 0
NERVOUS/ANXIOUS: 0
CHILLS: 0
SORE THROAT: 0
COUGH: 0
HEMATOCHEZIA: 0

## 2021-09-24 ASSESSMENT — MIFFLIN-ST. JEOR: SCORE: 1577.83

## 2021-09-24 NOTE — ASSESSMENT & PLAN NOTE
No specific concerns.  He remains active and enjoys biking.  No changes to urinary function.  He does have some intermittent urinary incontinence.  Has been following with [facility] (Dr. Murray).  Tolerant of a statin.  He has been on this for 15 years.  Weight stable.  Up-to-date with preventative health recommendations.  Wife is somewhat concerned about snoring although he has no history of daytime tiredness or elevated blood pressure.  For now, we will hold off pursuing evaluation by sleep medicine.  Fasting lab work today.  Follow-up in 1 year, sooner as needed.

## 2021-09-24 NOTE — PROGRESS NOTES
SUBJECTIVE:   CC: Jamel Hernandez is an 61 year old male who presents for preventative health visit.     Patient has been advised of split billing requirements and indicates understanding: Yes  Snoring:    - breathing has been disruptive at night   - he feels rested in the morning   - wife concerned about pauses with breathing.  Less recently.     Biking a ton.     Healthy Habits:     Getting at least 3 servings of Calcium per day:  NO    Bi-annual eye exam:  NO    Dental care twice a year:  Yes    Sleep apnea or symptoms of sleep apnea:  Excessive snoring    Diet:  Regular (no restrictions)    Frequency of exercise:  2-3 days/week    Duration of exercise:  Greater than 60 minutes    Taking medications regularly:  No    Medication side effects:  None    PHQ-2 Total Score: 0    Additional concerns today:  No    Today's PHQ-2 Score:   PHQ-2 ( 1999 Pfizer) 9/24/2021   Q1: Little interest or pleasure in doing things 0   Q2: Feeling down, depressed or hopeless 0   PHQ-2 Score 0   Q1: Little interest or pleasure in doing things Not at all   Q2: Feeling down, depressed or hopeless Not at all   PHQ-2 Score 0       Abuse: Current or Past(Physical, Sexual or Emotional)- No  Do you feel safe in your environment? Yes        Social History     Tobacco Use     Smoking status: Never Smoker     Smokeless tobacco: Never Used   Substance Use Topics     Alcohol use: Yes     Comment: Alcoholic Drinks/day: 1-2 drinks per week          Alcohol Use 9/24/2021   Prescreen: >3 drinks/day or >7 drinks/week? No       Last PSA:   Prostate Specific Antigen Screen   Date Value Ref Range Status   04/26/2019 5.8 (H) 0.0 - 3.5 ng/mL Final       Reviewed orders with patient. Reviewed health maintenance and updated orders accordingly - Yes  Labs reviewed in EPIC    Reviewed and updated as needed this visit by clinical staff  Tobacco  Allergies  Meds  Problems  Med Hx  Surg Hx  Fam Hx  Soc Hx          Reviewed and updated as needed this visit  "by Provider  Tobacco  Allergies   Problems  Med Hx  Surg Hx   Soc Hx         Review of Systems   Constitutional: Negative for chills and fever.   HENT: Negative for congestion, ear pain, hearing loss and sore throat.    Eyes: Negative for pain and visual disturbance.   Respiratory: Negative for cough and shortness of breath.    Cardiovascular: Negative for chest pain, palpitations and peripheral edema.   Gastrointestinal: Negative for abdominal pain, constipation, diarrhea, heartburn, hematochezia and nausea.   Genitourinary: Negative for discharge, dysuria, frequency, genital sores, hematuria, impotence and urgency.   Musculoskeletal: Negative for arthralgias, joint swelling and myalgias.   Skin: Negative for rash.   Neurological: Negative for dizziness, weakness, headaches and paresthesias.   Psychiatric/Behavioral: Negative for mood changes. The patient is not nervous/anxious.        OBJECTIVE:   /60 (BP Location: Left arm, Patient Position: Chair, Cuff Size: Adult Regular)   Pulse 80   Temp 98.2  F (36.8  C) (Oral)   Resp 16   Ht 1.727 m (5' 8\")   Wt 79.8 kg (176 lb)   SpO2 98%   BMI 26.76 kg/m      Physical Exam  Vitals reviewed.   Constitutional:       General: He is not in acute distress.     Appearance: Normal appearance.   HENT:      Head: Normocephalic and atraumatic.      Right Ear: External ear normal.      Left Ear: External ear normal.      Nose: Nose normal.      Mouth/Throat:      Pharynx: No oropharyngeal exudate or posterior oropharyngeal erythema.   Eyes:      General: No scleral icterus.  Cardiovascular:      Rate and Rhythm: Normal rate and regular rhythm.      Heart sounds: Normal heart sounds. No murmur heard.   No friction rub. No gallop.    Pulmonary:      Effort: Pulmonary effort is normal. No respiratory distress.      Breath sounds: No wheezing.   Abdominal:      General: Bowel sounds are normal. There is no distension.      Palpations: Abdomen is soft. There is no mass. " "     Tenderness: There is no abdominal tenderness.   Musculoskeletal:         General: No swelling. Normal range of motion.      Cervical back: Normal range of motion.   Skin:     Findings: No rash.   Neurological:      General: No focal deficit present.      Mental Status: He is alert and oriented to person, place, and time.      Cranial Nerves: No cranial nerve deficit.      Deep Tendon Reflexes: Reflexes normal.   Psychiatric:         Mood and Affect: Mood normal.         Behavior: Behavior normal.         Thought Content: Thought content normal.         Judgment: Judgment normal.           Diagnostic Test Results:  Labs reviewed in Epic    ASSESSMENT/PLAN:   Snores  STOP-BANG score 4.      Vasculogenic erectile dysfunction  Not a concern today. Sildenafil has been helpful in the past.     Routine general medical examination at a health care facility  No specific concerns.  He remains active and enjoys biking.  No changes to urinary function.  He does have some intermittent urinary incontinence.  Has been following with [facility] (Dr. Murray).  Tolerant of a statin.  He has been on this for 15 years.  Weight stable.  Up-to-date with preventative health recommendations.  Wife is somewhat concerned about snoring although he has no history of daytime tiredness or elevated blood pressure.  For now, we will hold off pursuing evaluation by sleep medicine.  Fasting lab work today.  Follow-up in 1 year, sooner as needed.      Patient has been advised of split billing requirements and indicates understanding: Yes  COUNSELING:   Reviewed preventive health counseling, as reflected in patient instructions       Regular exercise       Healthy diet/nutrition    Estimated body mass index is 26.76 kg/m  as calculated from the following:    Height as of this encounter: 1.727 m (5' 8\").    Weight as of this encounter: 79.8 kg (176 lb).     Weight management plan: Discussed healthy diet and exercise guidelines    He " reports that he has never smoked. He has never used smokeless tobacco.      Counseling Resources:  ATP IV Guidelines  Pooled Cohorts Equation Calculator  FRAX Risk Assessment  ICSI Preventive Guidelines  Dietary Guidelines for Americans, 2010  USDA's MyPlate  ASA Prophylaxis  Lung CA Screening    Pipo Helm MD  [facility]

## 2021-10-01 DIAGNOSIS — E78.00 PURE HYPERCHOLESTEROLEMIA: Primary | ICD-10-CM

## 2021-10-01 RX ORDER — ROSUVASTATIN CALCIUM 20 MG/1
20 TABLET, COATED ORAL DAILY
Qty: 90 TABLET | Refills: 0 | Status: SHIPPED | OUTPATIENT
Start: 2021-10-01 | End: 2022-01-05

## 2021-10-01 NOTE — TELEPHONE ENCOUNTER
Unread my chart message, called with with message below, he is in agreement with new medication. Set up to authorize.    Your fasting glucose level was in the normal range.  You do not have diabetes nor you have prediabetes.  Your cholesterol panel looks okay overall although your LDL-c has slowly crept up despite the increased dose of simvastatin.  I recommend we shift to rosuvastatin at 20 mg.  This would simply be a swap out of one cholesterol medicine for another.  Let me know if this change is okay. ...    Jewels Richardson LPN  10/1/2021  9:22 AM

## 2021-12-23 ENCOUNTER — IMMUNIZATION (OUTPATIENT)
Dept: NURSING | Facility: CLINIC | Age: 61
End: 2021-12-23
Payer: COMMERCIAL

## 2021-12-23 PROCEDURE — 91300 PR COVID VAC PFIZER DIL RECON 30 MCG/0.3 ML IM: CPT

## 2021-12-23 PROCEDURE — 0004A PR COVID VAC PFIZER DIL RECON 30 MCG/0.3 ML IM: CPT

## 2022-01-05 ENCOUNTER — MYC REFILL (OUTPATIENT)
Dept: FAMILY MEDICINE | Facility: CLINIC | Age: 62
End: 2022-01-05
Payer: COMMERCIAL

## 2022-01-05 DIAGNOSIS — E78.00 PURE HYPERCHOLESTEROLEMIA: ICD-10-CM

## 2022-01-07 RX ORDER — ROSUVASTATIN CALCIUM 20 MG/1
20 TABLET, COATED ORAL DAILY
Qty: 90 TABLET | Refills: 2 | Status: SHIPPED | OUTPATIENT
Start: 2022-01-07 | End: 2022-10-16

## 2022-01-07 NOTE — TELEPHONE ENCOUNTER
"Last Written Prescription Date:  10/1/21  Last Fill Quantity: 90,  # refills: 0   Last office visit provider:  9/24/21     Requested Prescriptions   Pending Prescriptions Disp Refills     rosuvastatin (CRESTOR) 20 MG tablet 90 tablet 0     Sig: Take 1 tablet (20 mg) by mouth daily       Statins Protocol Passed - 1/5/2022  8:14 PM        Passed - LDL on file in past 12 months     Recent Labs   Lab Test 09/24/21  1546                Passed - No abnormal creatine kinase in past 12 months     No lab results found.             Passed - Recent (12 mo) or future (30 days) visit within the authorizing provider's specialty     Patient has had an office visit with the authorizing provider or a provider within the authorizing providers department within the previous 12 mos or has a future within next 30 days. See \"Patient Info\" tab in inbasket, or \"Choose Columns\" in Meds & Orders section of the refill encounter.              Passed - Medication is active on med list        Passed - Patient is age 18 or older             adeel aparicio RN 01/07/22 2:29 PM  "

## 2022-01-16 ENCOUNTER — E-VISIT (OUTPATIENT)
Dept: URGENT CARE | Facility: CLINIC | Age: 62
End: 2022-01-16
Payer: COMMERCIAL

## 2022-01-16 DIAGNOSIS — Z20.822 SUSPECTED COVID-19 VIRUS INFECTION: Primary | ICD-10-CM

## 2022-01-16 PROCEDURE — 99421 OL DIG E/M SVC 5-10 MIN: CPT | Performed by: PHYSICIAN ASSISTANT

## 2022-01-16 RX ORDER — FLUTICASONE PROPIONATE 50 MCG
1 SPRAY, SUSPENSION (ML) NASAL DAILY
Qty: 16 G | Refills: 0 | Status: SHIPPED | OUTPATIENT
Start: 2022-01-16 | End: 2023-10-25

## 2022-01-16 RX ORDER — PSEUDOEPHEDRINE HCL 120 MG/1
120 TABLET, FILM COATED, EXTENDED RELEASE ORAL EVERY 12 HOURS
Qty: 15 TABLET | Refills: 0 | Status: SHIPPED | OUTPATIENT
Start: 2022-01-16 | End: 2023-10-25

## 2022-01-16 NOTE — PATIENT INSTRUCTIONS
The symptoms you describe suggest a viral cause, which is much more common than a bacterial cause. Antibiotics will treat bacterial infections, but have no effect on viral infections. If possible, especially if improving, start with symptom care for the first 7-10 days, then consider seeking further treatment or taking an antibiotic. Bacterial infections generally are more severe, including symptoms such as pus, fever over 101degrees F, or rapidly worsening.  Jamel,      Based on your responses, you may have coronavirus (COVID-19). This illness can cause fever, cough and trouble breathing. Many people get a mild case and get better on their own. Some people can get very sick.    Will I be tested for COVID-19?  We would like to test you for COVID-19 virus. I have placed orders for this test.     To schedule: go to your [employer] home page and scroll down to the section that says  You have an appointment that needs to be scheduled  and click the large green button that says  Schedule Now  and follow the steps to find the next available openings.    If you are unable to complete these [employer] scheduling steps, please call [phone] to schedule your testing.     Return to work/school/ guidance:  Please let your workplace manager and staffing office know when your isolation ends.       If you receive a positive COVID-19 test result, follow the guidance of the those who are giving you the results. Usually the return to work is 10 days from symptom onset or positive test date, (or in some cases 20 days if you are immunocompromised). If your symptoms started after your positive test, the 10 days should start when your symptoms started.   o If you work at [employer], you must also be cleared by Employee Occupational Health and Safety to return to work.      If you receive a negative COVID-19 test result and did not have a high risk exposure to someone with a known positive COVID-19 test, you can return to  work once you're free of fever for 24 hours without fever-reducing medication and your symptoms are improving or resolved.    If you receive a negative COVID-19 test and had a high-risk exposure to someone who has tested positive for COVID-19 then you can return to work 14 days after your last contact with the positive individual. Follow quarantine guidance given by your doctor or public health officials.     Sign up for [employer]:  We know it's scary to hear that you might have COVID-19. We want to track your symptoms to make sure you're okay over the next 2 weeks. Please look for an email from [employer]--this is a free, online program that we'll use to keep in touch. To sign up, follow the link in the email you will receive. Learn more at [email or web address]    How can I take care of myself?  Over the counter medications may help with your symptoms like congestion, cough, chills, or fever.    There are not many effective prescription treatments for early COVID-19. Hydroxychloroquine, ivermectin, and azithromycin are not effective or recommended for COVID-19.    If your symptoms started in the last 10 days, you may be able to receive a treatment with monoclonal antibodies. This treatment can lower your risk of severe illness and going to the hospital. It is given through an IV or under your skin (subcutaneous) and must be given at an infusion center. You must be 12 or older, weight at least 88 pounds, and have a positive COVID-19 test.     If you would like to sign up to be considered to receive the monoclonal antibody medicine, please complete a participation form through the [facility] here: MNRAP ([email or web address]). You may also call the [facility] COVID-19 Public Hotline at 1-213.597.1737 (open Mon-Fri: 9am-7pm and Sat: 10am-6pm).     Not all people who are eligible will receive the medicine, since supply is limited. You will be  contacted in the next 1 to 2 business days only if you are selected. If you do not receive a call, you have not been selected to receive the medicine. If you have any questions about this medication, please contact your primary care provider. For more information, see [email or web address]/[email or web address]      Get lots of rest. Drink extra fluids (unless a doctor has told you not to)    Take Tylenol (acetaminophen) or ibuprofen for fever or pain. If you have liver or kidney problems, ask your family doctor if it's okay to take Tylenol o ibuprofen    Take over the counter medications for your symptoms, as directed by your doctor. You may also talk to your pharmacist.      If you have other health problems (like cancer, heart failure, an organ transplant or severe kidney disease): Call your specialty clinic if you don't feel better in the next 2 days.    Know when to call 911. Emergency warning signs include:  o Trouble breathing or shortness of breath  o Pain or pressure in the chest that doesn't go away  o Feeling confused like you haven't felt before, or not being able to wake up  o Bluish-colored lips or face    Where can I get more information?    [facility] - About COVID-19: [email or web address]     CDC - What to Do If You're Sick:     www.cdc.gov/coronavirus/2019-ncov/about/steps-when-sick.html    CDC - Ending Home Isolation:  https://www.cdc.gov/coronavirus/2019-ncov/your-health/quarantine-isolation.html    CDC - Caring for Someone:  www.cdc.gov/coronavirus/2019-ncov/if-you-are-sick/care-for-someone.html    [facility] clinical trials (COVID-19 research studies): clinicalaffairs.[facility]/umn-clinical-trials    Below are the COVID-19 hotlines at the [facility] ([facility]). Interpreters are available.  o For health questions: Call 556-786-5887 or 1-594.767.4012 (7 a.m. to 7 p.m.)  o For questions about schools and childcare: Call 228-241-3193 or  2-177-770-4396 (7 a.m. to 7 p.m.)

## 2022-01-28 ENCOUNTER — TRANSFERRED RECORDS (OUTPATIENT)
Dept: HEALTH INFORMATION MANAGEMENT | Facility: CLINIC | Age: 62
End: 2022-01-28
Payer: COMMERCIAL

## 2022-03-03 ENCOUNTER — TRANSFERRED RECORDS (OUTPATIENT)
Dept: HEALTH INFORMATION MANAGEMENT | Facility: CLINIC | Age: 62
End: 2022-03-03
Payer: COMMERCIAL

## 2022-09-07 ENCOUNTER — TRANSFERRED RECORDS (OUTPATIENT)
Dept: HEALTH INFORMATION MANAGEMENT | Facility: CLINIC | Age: 62
End: 2022-09-07

## 2022-10-15 DIAGNOSIS — E78.00 PURE HYPERCHOLESTEROLEMIA: ICD-10-CM

## 2022-10-15 NOTE — TELEPHONE ENCOUNTER
"Routing refill request to provider for review/approval because:  Labs not current:  ldl  Patient needs to be seen because it has been more than 1 year since last office visit.    Last Written Prescription Date:  1/7/22  Last Fill Quantity: 90,  # refills: 2   Last office visit provider:  9/24/21     Requested Prescriptions   Pending Prescriptions Disp Refills     rosuvastatin (CRESTOR) 20 MG tablet [Pharmacy Med Name: Rosuvastatin Calcium Oral Tablet 20 MG] 90 tablet 0     Sig: Take 1 tablet (20 mg) by mouth daily       Statins Protocol Failed - 10/15/2022  2:00 AM        Failed - LDL on file in past 12 months     Recent Labs   Lab Test 09/24/21  1546                Failed - Recent (12 mo) or future (30 days) visit within the authorizing provider's specialty     Patient has had an office visit with the authorizing provider or a provider within the authorizing providers department within the previous 12 mos or has a future within next 30 days. See \"Patient Info\" tab in inbasket, or \"Choose Columns\" in Meds & Orders section of the refill encounter.              Passed - No abnormal creatine kinase in past 12 months     No lab results found.             Passed - Medication is active on med list        Passed - Patient is age 18 or older             Megan Mishra RN 10/15/22 1:54 PM  "

## 2022-10-16 RX ORDER — ROSUVASTATIN CALCIUM 20 MG/1
20 TABLET, COATED ORAL DAILY
Qty: 90 TABLET | Refills: 0 | Status: SHIPPED | OUTPATIENT
Start: 2022-10-16 | End: 2023-01-04

## 2022-10-29 ENCOUNTER — IMMUNIZATION (OUTPATIENT)
Dept: FAMILY MEDICINE | Facility: CLINIC | Age: 62
End: 2022-10-29
Payer: COMMERCIAL

## 2022-10-29 PROCEDURE — 90471 IMMUNIZATION ADMIN: CPT

## 2022-10-29 PROCEDURE — 90682 RIV4 VACC RECOMBINANT DNA IM: CPT

## 2023-01-04 ENCOUNTER — MYC REFILL (OUTPATIENT)
Dept: FAMILY MEDICINE | Facility: CLINIC | Age: 63
End: 2023-01-04

## 2023-01-04 DIAGNOSIS — E78.00 PURE HYPERCHOLESTEROLEMIA: ICD-10-CM

## 2023-01-05 RX ORDER — ROSUVASTATIN CALCIUM 20 MG/1
20 TABLET, COATED ORAL DAILY
Qty: 90 TABLET | Refills: 1 | Status: SHIPPED | OUTPATIENT
Start: 2023-01-05 | End: 2023-03-03

## 2023-01-05 NOTE — TELEPHONE ENCOUNTER
"Routing refill request to provider for review/approval because:  Patient needs to be seen because it has been more than 1 year since last office visit.    Last Written Prescription Date:  10/16/22  Last Fill Quantity: 90,  # refills: 0   Last office visit provider:  9/24/21     Requested Prescriptions   Pending Prescriptions Disp Refills     rosuvastatin (CRESTOR) 20 MG tablet 90 tablet 0     Sig: Take 1 tablet (20 mg) by mouth daily       Statins Protocol Failed - 1/4/2023  7:23 PM        Failed - LDL on file in past 12 months     Recent Labs   Lab Test 09/24/21  1546                Passed - No abnormal creatine kinase in past 12 months     No lab results found.             Passed - Recent (12 mo) or future (30 days) visit within the authorizing provider's specialty     Patient has had an office visit with the authorizing provider or a provider within the authorizing providers department within the previous 12 mos or has a future within next 30 days. See \"Patient Info\" tab in inbasket, or \"Choose Columns\" in Meds & Orders section of the refill encounter.              Passed - Medication is active on med list        Passed - Patient is age 18 or older             Nicolasa Estrella RN 01/05/23 1:16 PM  "

## 2023-01-05 NOTE — TELEPHONE ENCOUNTER
Left message to call back for: Patient x1  Information to relay to patient: Patient due for visit in the next few months. Please help patient schedule next avail annual prev. with pcp.

## 2023-03-03 ENCOUNTER — OFFICE VISIT (OUTPATIENT)
Dept: FAMILY MEDICINE | Facility: CLINIC | Age: 63
End: 2023-03-03
Payer: COMMERCIAL

## 2023-03-03 VITALS
TEMPERATURE: 98.2 F | WEIGHT: 185.44 LBS | HEART RATE: 66 BPM | OXYGEN SATURATION: 96 % | SYSTOLIC BLOOD PRESSURE: 124 MMHG | RESPIRATION RATE: 16 BRPM | DIASTOLIC BLOOD PRESSURE: 79 MMHG | BODY MASS INDEX: 28.1 KG/M2 | HEIGHT: 68 IN

## 2023-03-03 DIAGNOSIS — E78.00 PURE HYPERCHOLESTEROLEMIA: ICD-10-CM

## 2023-03-03 DIAGNOSIS — Z00.00 ROUTINE GENERAL MEDICAL EXAMINATION AT A HEALTH CARE FACILITY: Primary | ICD-10-CM

## 2023-03-03 DIAGNOSIS — Z13.1 SCREENING FOR DIABETES MELLITUS: ICD-10-CM

## 2023-03-03 LAB
ALT SERPL W P-5'-P-CCNC: 34 U/L (ref 10–50)
ANION GAP SERPL CALCULATED.3IONS-SCNC: 12 MMOL/L (ref 7–15)
BUN SERPL-MCNC: 16.2 MG/DL (ref 8–23)
CALCIUM SERPL-MCNC: 9.2 MG/DL (ref 8.8–10.2)
CHLORIDE SERPL-SCNC: 104 MMOL/L (ref 98–107)
CHOLEST SERPL-MCNC: 174 MG/DL
CREAT SERPL-MCNC: 1.12 MG/DL (ref 0.67–1.17)
DEPRECATED HCO3 PLAS-SCNC: 23 MMOL/L (ref 22–29)
GFR SERPL CREATININE-BSD FRML MDRD: 74 ML/MIN/1.73M2
GLUCOSE SERPL-MCNC: 93 MG/DL (ref 70–99)
HDLC SERPL-MCNC: 48 MG/DL
LDLC SERPL CALC-MCNC: 109 MG/DL
NONHDLC SERPL-MCNC: 126 MG/DL
POTASSIUM SERPL-SCNC: 4.1 MMOL/L (ref 3.4–5.3)
SODIUM SERPL-SCNC: 139 MMOL/L (ref 136–145)
TRIGL SERPL-MCNC: 86 MG/DL

## 2023-03-03 PROCEDURE — 84460 ALANINE AMINO (ALT) (SGPT): CPT | Performed by: FAMILY MEDICINE

## 2023-03-03 PROCEDURE — 80048 BASIC METABOLIC PNL TOTAL CA: CPT | Performed by: FAMILY MEDICINE

## 2023-03-03 PROCEDURE — 36415 COLL VENOUS BLD VENIPUNCTURE: CPT | Performed by: FAMILY MEDICINE

## 2023-03-03 PROCEDURE — 80061 LIPID PANEL: CPT | Performed by: FAMILY MEDICINE

## 2023-03-03 PROCEDURE — 99396 PREV VISIT EST AGE 40-64: CPT | Performed by: FAMILY MEDICINE

## 2023-03-03 RX ORDER — ROSUVASTATIN CALCIUM 20 MG/1
20 TABLET, COATED ORAL DAILY
Qty: 90 TABLET | Refills: 3 | Status: SHIPPED | OUTPATIENT
Start: 2023-03-03 | End: 2024-04-23

## 2023-03-03 ASSESSMENT — ENCOUNTER SYMPTOMS
PALPITATIONS: 0
CHILLS: 0
EYE PAIN: 0
DYSURIA: 0
WEAKNESS: 0
PARESTHESIAS: 0
ARTHRALGIAS: 0
DIARRHEA: 0
SORE THROAT: 0
JOINT SWELLING: 0
FEVER: 0
HEADACHES: 0
HEARTBURN: 0
HEMATURIA: 0
ABDOMINAL PAIN: 0
COUGH: 0
FREQUENCY: 0
NERVOUS/ANXIOUS: 0
CONSTIPATION: 0
HEMATOCHEZIA: 0
MYALGIAS: 0
DIZZINESS: 0
NAUSEA: 0

## 2023-03-03 NOTE — PROGRESS NOTES
SUBJECTIVE:   CC: Jamel is an 62 year old who presents for preventative health visit.     Chief Complaint   Patient presents with     Physical     Pt. Fasting.     Prostate health: he had a biopsy and MRI.  No cancer.      Patient has been advised of split billing requirements and indicates understanding: Yes  Healthy Habits:     Getting at least 3 servings of Calcium per day:  NO    Bi-annual eye exam:  Yes    Dental care twice a year:  Yes    Sleep apnea or symptoms of sleep apnea:  None    Diet:  Regular (no restrictions)    Frequency of exercise:  2-3 days/week    Duration of exercise:  15-30 minutes    Taking medications regularly:  Yes    Medication side effects:  None    PHQ-2 Total Score: 0    Additional concerns today:  No    Today's PHQ-2 Score:   PHQ-2 ( 1999 [employer]) 3/3/2023   Q1: Little interest or pleasure in doing things 0   Q2: Feeling down, depressed or hopeless 0   PHQ-2 Score 0   PHQ-2 Total Score (12-17 Years)- Positive if 3 or more points; Administer PHQ-A if positive -   Q1: Little interest or pleasure in doing things Not at all   Q2: Feeling down, depressed or hopeless Not at all   PHQ-2 Score 0           Social History     Tobacco Use     Smoking status: Never     Passive exposure: Never     Smokeless tobacco: Never   Substance Use Topics     Alcohol use: Yes     Comment: Alcoholic Drinks/day: 1-2 drinks per week          Alcohol Use 3/3/2023   Prescreen: >3 drinks/day or >7 drinks/week? Not Applicable       Last PSA:   Prostate Specific Antigen Screen   Date Value Ref Range Status   09/24/2021 6.55 (H) 0.00 - 4.50 ug/L Final       Reviewed orders with patient. Reviewed health maintenance and updated orders accordingly - Yes    Reviewed and updated as needed this visit by clinical staff    Allergies               Reviewed and updated as needed this visit by Provider                   Review of Systems   Constitutional: Negative for chills and fever.   HENT: Negative for congestion, ear pain,  "hearing loss and sore throat.    Eyes: Negative for pain and visual disturbance.   Respiratory: Negative for cough.    Cardiovascular: Negative for chest pain, palpitations and peripheral edema.   Gastrointestinal: Negative for abdominal pain, constipation, diarrhea, heartburn, hematochezia and nausea.   Genitourinary: Negative for dysuria, frequency, genital sores, hematuria, impotence, penile discharge and urgency.   Musculoskeletal: Negative for arthralgias, joint swelling and myalgias.   Skin: Negative for rash.   Neurological: Negative for dizziness, weakness, headaches and paresthesias.   Psychiatric/Behavioral: Negative for mood changes. The patient is not nervous/anxious.        OBJECTIVE:   /79 (BP Location: Left arm, Patient Position: Sitting, Cuff Size: Adult Large)   Pulse 66   Temp 98.2  F (36.8  C) (Oral)   Resp 16   Ht 1.727 m (5' 8\")   Wt 84.1 kg (185 lb 7 oz)   SpO2 96%   BMI 28.20 kg/m      Physical Exam      Diagnostic Test Results:  Labs reviewed in Epic    ASSESSMENT/PLAN:     Problem List Items Addressed This Visit     Pure hypercholesterolemia     Tolerant.  Check lipids.          Relevant Medications    rosuvastatin (CRESTOR) 20 MG tablet    Other Relevant Orders    Lipid panel reflex to direct LDL Fasting    Routine general medical examination at a health care facility - Primary     Doing well.  Fasting lipids.  Shingrix ordered.        Other Visit Diagnoses     Screening for diabetes mellitus        Relevant Orders    Lipid panel reflex to direct LDL Fasting    Basic metabolic panel  (Ca, Cl, CO2, Creat, Gluc, K, Na, BUN)    ALT          Patient has been advised of split billing requirements and indicates understanding: Yes      COUNSELING:   Reviewed preventive health counseling, as reflected in patient instructions       Regular exercise       Healthy diet/nutrition    He reports that he has never smoked. He has never been exposed to tobacco smoke. He has never used smokeless " tobacco.    Pipo Helm MD  [facility]

## 2023-04-21 ENCOUNTER — ALLIED HEALTH/NURSE VISIT (OUTPATIENT)
Dept: FAMILY MEDICINE | Facility: CLINIC | Age: 63
End: 2023-04-21
Payer: COMMERCIAL

## 2023-04-21 DIAGNOSIS — Z23 NEED FOR SHINGLES VACCINE: Primary | ICD-10-CM

## 2023-04-21 PROCEDURE — 90471 IMMUNIZATION ADMIN: CPT

## 2023-04-21 PROCEDURE — 90750 HZV VACC RECOMBINANT IM: CPT

## 2023-04-21 PROCEDURE — 99207 PR NO CHARGE NURSE ONLY: CPT

## 2023-04-21 NOTE — PROGRESS NOTES
Prior to immunization administration, verified patients identity using patient s name and date of birth. Please see Immunization Activity for additional information.     Screening Questionnaire for Adult Immunization    Are you sick today?   No   Do you have allergies to medications, food, a vaccine component or latex?   No   Have you ever had a serious reaction after receiving a vaccination?   No   Do you have a long-term health problem with heart, lung, kidney, or metabolic disease (e.g., diabetes), asthma, a blood disorder, no spleen, complement component deficiency, a cochlear implant, or a spinal fluid leak?  Are you on long-term aspirin therapy?   No   Do you have cancer, leukemia, HIV/AIDS, or any other immune system problem?   No   Do you have a parent, brother, or sister with an immune system problem?   No   In the past 3 months, have you taken medications that affect  your immune system, such as prednisone, other steroids, or anticancer drugs; drugs for the treatment of rheumatoid arthritis, Crohn s disease, or psoriasis; or have you had radiation treatments?   No   Have you had a seizure, or a brain or other nervous system problem?   No   During the past year, have you received a transfusion of blood or blood    products, or been given immune (gamma) globulin or antiviral drug?   No   For women: Are you pregnant or is there a chance you could become       pregnant during the next month?   No   Have you received any vaccinations in the past 4 weeks?   No     Immunization questionnaire answers were all negative.    I have reviewed the following standing orders:   This patient is due and qualifies for the Zoster vaccine.    Click here for Zoster Standing Order    I have reviewed the vaccines inclusion and exclusion criteria; No concerns regarding eligibility.         Injection of shingrix given by Renee Chan RN. Patient instructed to remain in clinic for 15 minutes afterwards, and to report any adverse  reactions.     Screening performed by Renee Chan RN on 4/21/2023 at 3:07 PM.

## 2023-07-26 DIAGNOSIS — R97.20 ELEVATED PROSTATE SPECIFIC ANTIGEN (PSA): Primary | ICD-10-CM

## 2023-08-31 ENCOUNTER — LAB (OUTPATIENT)
Dept: LAB | Facility: CLINIC | Age: 63
End: 2023-08-31
Payer: COMMERCIAL

## 2023-08-31 DIAGNOSIS — R97.20 ELEVATED PROSTATE SPECIFIC ANTIGEN (PSA): ICD-10-CM

## 2023-08-31 PROCEDURE — 84153 ASSAY OF PSA TOTAL: CPT

## 2023-08-31 PROCEDURE — 36415 COLL VENOUS BLD VENIPUNCTURE: CPT

## 2023-09-01 LAB — PSA SERPL DL<=0.01 NG/ML-MCNC: 8.17 NG/ML (ref 0–4.5)

## 2023-09-12 ENCOUNTER — TRANSFERRED RECORDS (OUTPATIENT)
Dept: HEALTH INFORMATION MANAGEMENT | Facility: CLINIC | Age: 63
End: 2023-09-12
Payer: COMMERCIAL

## 2023-10-18 ENCOUNTER — ALLIED HEALTH/NURSE VISIT (OUTPATIENT)
Dept: FAMILY MEDICINE | Facility: CLINIC | Age: 63
End: 2023-10-18
Payer: COMMERCIAL

## 2023-10-18 DIAGNOSIS — Z23 NEED FOR VACCINATION: Primary | ICD-10-CM

## 2023-10-18 PROCEDURE — 90472 IMMUNIZATION ADMIN EACH ADD: CPT

## 2023-10-18 PROCEDURE — 99207 PR NO CHARGE NURSE ONLY: CPT

## 2023-10-18 PROCEDURE — 90682 RIV4 VACC RECOMBINANT DNA IM: CPT

## 2023-10-18 PROCEDURE — 90471 IMMUNIZATION ADMIN: CPT

## 2023-10-18 PROCEDURE — 90750 HZV VACC RECOMBINANT IM: CPT

## 2023-10-25 ENCOUNTER — OFFICE VISIT (OUTPATIENT)
Dept: FAMILY MEDICINE | Facility: CLINIC | Age: 63
End: 2023-10-25
Payer: OTHER MISCELLANEOUS

## 2023-10-25 VITALS
TEMPERATURE: 98.3 F | WEIGHT: 180.2 LBS | HEIGHT: 68 IN | RESPIRATION RATE: 12 BRPM | HEART RATE: 68 BPM | DIASTOLIC BLOOD PRESSURE: 85 MMHG | OXYGEN SATURATION: 99 % | BODY MASS INDEX: 27.31 KG/M2 | SYSTOLIC BLOOD PRESSURE: 128 MMHG

## 2023-10-25 DIAGNOSIS — Z01.818 PREOPERATIVE EXAMINATION: Primary | ICD-10-CM
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
## 2023-10-25 DIAGNOSIS — S46.001A ROTATOR CUFF INJURY, RIGHT, INITIAL ENCOUNTER: ICD-10-CM

## 2023-10-25 LAB
ATRIAL RATE - MUSE: 63 BPM
DIASTOLIC BLOOD PRESSURE - MUSE: 85 MMHG
HGB BLD-MCNC: 13.8 G/DL (ref 13.3–17.7)
HOLD SPECIMEN: NORMAL
INTERPRETATION ECG - MUSE: NORMAL
P AXIS - MUSE: 28 DEGREES
PR INTERVAL - MUSE: 158 MS
QRS DURATION - MUSE: 82 MS
QT - MUSE: 400 MS
QTC - MUSE: 409 MS
R AXIS - MUSE: 28 DEGREES
SYSTOLIC BLOOD PRESSURE - MUSE: 128 MMHG
T AXIS - MUSE: 16 DEGREES
VENTRICULAR RATE- MUSE: 63 BPM

## 2023-10-25 PROCEDURE — 36415 COLL VENOUS BLD VENIPUNCTURE: CPT | Performed by: FAMILY MEDICINE

## 2023-10-25 PROCEDURE — 99214 OFFICE O/P EST MOD 30 MIN: CPT | Mod: 25 | Performed by: FAMILY MEDICINE

## 2023-10-25 PROCEDURE — 85018 HEMOGLOBIN: CPT | Performed by: FAMILY MEDICINE

## 2023-10-25 PROCEDURE — 80048 BASIC METABOLIC PNL TOTAL CA: CPT | Performed by: FAMILY MEDICINE

## 2023-10-25 PROCEDURE — 93005 ELECTROCARDIOGRAM TRACING: CPT | Performed by: FAMILY MEDICINE

## 2023-10-25 PROCEDURE — 93010 ELECTROCARDIOGRAM REPORT: CPT | Performed by: INTERNAL MEDICINE

## 2023-10-25 RX ORDER — SILDENAFIL CITRATE 20 MG/1
20 TABLET ORAL PRN
COMMUNITY
Start: 2022-01-28

## 2023-10-25 NOTE — PATIENT INSTRUCTIONS
Preparing for Your Surgery  Getting started  A nurse will call you to review your health history and instructions. They will give you an arrival time based on your scheduled surgery time. Please be ready to share:  Your doctor's clinic name and phone number  Your medical, surgical, and anesthesia history  A list of allergies and sensitivities  A list of medicines, including herbal treatments and over-the-counter drugs  Whether the patient has a legal guardian (ask how to send us the papers in advance)  Please tell us if you're pregnant--or if there's any chance you might be pregnant. Some surgeries may injure a fetus (unborn baby), so they require a pregnancy test. Surgeries that are safe for a fetus don't always need a test, and you can choose whether to have one.   If you have a child who's having surgery, please ask for a copy of Preparing for Your Child's Surgery.    Preparing for surgery  Within 10 to 30 days of surgery: Have a pre-op exam (sometimes called an H&P, or History and Physical). This can be done at a clinic or pre-operative center.  If you're having a , you may not need this exam. Talk to your care team.  At your pre-op exam, talk to your care team about all medicines you take. If you need to stop any medicines before surgery, ask when to start taking them again.  We do this for your safety. Many medicines can make you bleed too much during surgery. Some change how well surgery (anesthesia) drugs work.  Call your insurance company to let them know you're having surgery. (If you don't have insurance, call [phone].)  Call your clinic if there's any change in your health. This includes signs of a cold or flu (sore throat, runny nose, cough, rash, fever). It also includes a scrape or scratch near the surgery site.  If you have questions on the day of surgery, call your hospital or surgery center.  Eating and drinking guidelines  For your safety: Unless your surgeon tells you otherwise,  follow the guidelines below.  Eat and drink as usual until 8 hours before you arrive for surgery. After that, no food or milk.  Drink clear liquids until 2 hours before you arrive. These are liquids you can see through, like water, Gatorade, and Propel Water. They also include plain black coffee and tea (no cream or milk), candy, and breath mints. You can spit out gum when you arrive.  If you drink alcohol: Stop drinking it the night before surgery.  If your care team tells you to take medicine on the morning of surgery, it's okay to take it with a sip of water.  Preventing infection  Shower or bathe the night before and morning of your surgery. Follow the instructions your clinic gave you. (If no instructions, use regular soap.)  Don't shave or clip hair near your surgery site. We'll remove the hair if needed.  Don't smoke or vape the morning of surgery. You may chew nicotine gum up to 2 hours before surgery. A nicotine patch is okay.  Note: Some surgeries require you to completely quit smoking and nicotine. Check with your surgeon.  Your care team will make every effort to keep you safe from infection. We will:  Clean our hands often with soap and water (or an alcohol-based hand rub).  Clean the skin at your surgery site with a special soap that kills germs.  Give you a special gown to keep you warm. (Cold raises the risk of infection.)  Wear special hair covers, masks, gowns and gloves during surgery.  Give antibiotic medicine, if prescribed. Not all surgeries need antibiotics.  What to bring on the day of surgery  Photo ID and insurance card  Copy of your health care directive, if you have one  Glasses and hearing aids (bring cases)  You can't wear contacts during surgery  Inhaler and eye drops, if you use them (tell us about these when you arrive)  CPAP machine or breathing device, if you use them  A few personal items, if spending the night  If you have . . .  A pacemaker, ICD (cardiac defibrillator) or other  implant: Bring the ID card.  An implanted stimulator: Bring the remote control.  A legal guardian: Bring a copy of the certified (court-stamped) guardianship papers.  Please remove any jewelry, including body piercings. Leave jewelry and other valuables at home.  If you're going home the day of surgery  You must have a responsible adult drive you home. They should stay with you overnight as well.  If you don't have someone to stay with you, and you aren't safe to go home alone, we may keep you overnight. Insurance often won't pay for this.  After surgery  If it's hard to control your pain or you need more pain medicine, please call your surgeon's office.  Questions?   If you have any questions for your care team, list them here: _________________________________________________________________________________________________________________________________________________________________________ ____________________________________ ____________________________________ ____________________________________  For informational purposes only. Not to replace the advice of your health care provider. Copyright   2003, 2019 [employer]. All rights reserved. Clinically reviewed by Judy Rodriguez MD. [employer] 154807 - REV 12/22.    How to Take Your Medication Before Surgery  - HOLD (do not take) all medications the morning of surgery  - STOP taking all vitamins and herbal supplements 14 days before surgery.

## 2023-10-26 LAB
ANION GAP SERPL CALCULATED.3IONS-SCNC: 12 MMOL/L (ref 7–15)
BUN SERPL-MCNC: 20.4 MG/DL (ref 8–23)
CALCIUM SERPL-MCNC: 9.1 MG/DL (ref 8.8–10.2)
CHLORIDE SERPL-SCNC: 105 MMOL/L (ref 98–107)
CREAT SERPL-MCNC: 1 MG/DL (ref 0.67–1.17)
DEPRECATED HCO3 PLAS-SCNC: 22 MMOL/L (ref 22–29)
EGFRCR SERPLBLD CKD-EPI 2021: 85 ML/MIN/1.73M2
GLUCOSE SERPL-MCNC: 96 MG/DL (ref 70–99)
POTASSIUM SERPL-SCNC: 4.4 MMOL/L (ref 3.4–5.3)
SODIUM SERPL-SCNC: 139 MMOL/L (ref 135–145)

## 2023-11-22 NOTE — PROGRESS NOTES
MEDICARE WELLNESS VISIT + NOTE    CHIEF COMPLAINT:  Renetta Camacho presents for her Subsequent Annual Medicare Wellness Visit.   Her additional complaints or concerns are addressed below.    Patient has given consent to record this visit for documentation in their clinical record.    Patient Care Team:  Jules Bentley MD as PCP - General (Family Practice)  Channing Mcmillan MD (Hematology & Oncology)  Ever Jefferson MD as Endocrinologist (Internal Medicine - Endocrinology,Diabetes,Metabolism)  Jules Pizano MD as Referring Provider (Ophthalmology)        Patient Active Problem List   Diagnosis   • Basal cell carcinoma of skin   • Chronic neck pain   • Hypercholesteremia   • Multiple thyroid nodules   • Osteopenia   • Polycythemia   • Sleep apnea in adult   • Snoring   • Varicose veins of leg with pain   • Vasomotor flushing   • Vitreous floaters   • Elevated blood pressure reading without diagnosis of hypertension   • Trigger finger, right middle finger   • Health care maintenance   • Thyroid nodule   • Family history of breast cancer   • Need for influenza vaccination   • Trochanteric bursitis of left hip   • Dyslipidemia   • Acute maxillary sinusitis   • Acute recurrent maxillary sinusitis         Past Medical History:   Diagnosis Date   • Anxiety    • Depressive disorder    • Erythrocytosis    • History of COVID-19 07/2022   • Hypercholesteremia    • DOMINIK on CPAP    • Polycythemia    • Thrombocytosis    • Thyroid nodule          Past Surgical History:   Procedure Laterality Date   • Tonsillectomy           Social History     Tobacco Use   • Smoking status: Never   • Smokeless tobacco: Never   Vaping Use   • Vaping Use: never used   Substance Use Topics   • Alcohol use: Yes     Alcohol/week: 7.0 standard drinks of alcohol     Types: 7 Glasses of wine per week     Comment: social   • Drug use: Never     Family History   Problem Relation Age of Onset   • Hypertension Mother    • Bilateral breast cancer  [place] [address]  [facility] [address]  Phone: [phone]  Fax: [phone]  Primary Provider: Pipo Helm  Pre-op Performing Provider: NADEGE ONEAL      PREOPERATIVE EVALUATION:  Today's date: 10/25/2023    Jamel is a 63 year old male who presents for a preoperative evaluation.      10/25/2023     2:45 PM   Additional Questions   Roomed by MANI Weller   Accompanied by Self         10/25/2023     2:45 PM   Patient Reported Additional Medications   Patient reports taking the following new medications N/A       Surgical Information:  Surgery/Procedure: Right Rotator Cuff Repair  Surgery Location: [facility]  Surgeon: Dr. Rouse  Surgery Date: 11/6/23  Time of Surgery: TBD  Where patient plans to recover: At home with family  Fax number for surgical facility: [phone]    Assessment & Plan     The proposed surgical procedure is considered INTERMEDIATE risk.    Problem List Items Addressed This Visit    None  Visit Diagnoses       Preoperative examination    -  Primary    Relevant Orders    EKG 12-lead, tracing only (Completed)    Basic metabolic panel  (Ca, Cl, CO2, Creat, Gluc, K, Na, BUN)    Hemoglobin with Reflex to Iron Studies (Completed)    Rotator cuff injury, right, initial encounter        Relevant Orders    Basic metabolic panel  (Ca, Cl, CO2, Creat, Gluc, K, Na, BUN)    Hemoglobin with Reflex to Iron Studies (Completed)    Work related injury                        - No identified additional risk factors other than previously addressed    Antiplatelet or Anticoagulation Medication Instructions:   - Patient is on no antiplatelet or anticoagulation medications.    Additional Medication Instructions:  Patient is to take all scheduled medications on the day of surgery    RECOMMENDATION:  APPROVAL GIVEN to proceed with proposed procedure, without further diagnostic evaluation.      Subjective       HPI related to upcoming  Mother    • Osteoporosis Mother    • Heart disease Mother    • Heart disease Father          Current Outpatient Medications   Medication Sig Dispense Refill   • hydroxyUREA (HYDREA) 500 MG capsule 1500mg Mon Wed Fri and 1000mg rest of the days     • Calcium Carb-Cholecalciferol (CALCIUM 500 + D PO) Calcium 500 + D     • atorvastatin (LIPITOR) 20 MG tablet TAKE 1 TABLET AT BEDTIME 90 tablet 3   • aspirin 81 MG tablet Take 81 mg by mouth daily.        No current facility-administered medications for this visit.        The following items on the Medicare Health Risk Assessment were found to be positive  11e.) Tiredness or Fatigue: Often     14.) During the past 4 weeks, was someone available to help if you needed and wanted help?: Yes, some         Vision and Hearing screens:   Vision Screening    Right eye Left eye Both eyes   Without correction      With correction   20/20   Hearing Screening - Comments:: Fingers rub-ok both sides.      Advance care planning documents on file - no   Will bring in    Cognitive/Functional Status: no evidence of cognitive dysfunction by direct observation    Opioid Review: Renetta is not taking opioid medications.    Recent PHQ 2/9 Score:    PHQ 2:  PHQ 2 Score Adult PHQ 2 Score Adult PHQ 2 Interpretation Little interest or pleasure in activity?   11/20/2023   5:29 PM 0 No further screening needed 0       PHQ 9:       DEPRESSION ASSESSMENT/PLAN:  Depression screening is negative no further plan needed.     Body mass index is 24.58 kg/m².    BMI ASSESSMENT/PLAN:  Patient BMI is within normal range.     See Patient Instructions section.   Return in about 1 year (around 11/22/2024) for Medicare Wellness Visit.      OUTPATIENT PROGRESS NOTE    Subjective   Chief Complaint Office Visit, Medicare Wellness Visit (Fasting.), and Lipids    A 69-year-old female present for a medicare wellness visit.     HPI     Hypercholesterolemia: On Atorvastatin 20 mg daily. Has no issues with medications.  procedure: Around middle of July 2023, working with equipment and lifting it and felt 'tweek' in right shoulder. Able to continue work but with mild discomfort. The next day discomfort slightly worse and continued to worsen over next three weeks to the point that he could not lift right arm above head. Went to orthopedics and started into PT and conservative treatment with minimal improvement. Further evaluation revealed rotator cuff injury requiring planned procedure to meet maximal medical improvement.        10/25/2023     2:36 PM   Preop Questions   1. Have you ever had a heart attack or stroke? No   2. Have you ever had surgery on your heart or blood vessels, such as a stent placement, a coronary artery bypass, or surgery on an artery in your head, neck, heart, or legs? No   3. Do you have chest pain with activity? No   4. Do you have a history of  heart failure? No   5. Do you currently have a cold, bronchitis or symptoms of other infection? No   6. Do you have a cough, shortness of breath, or wheezing? No   7. Do you or anyone in your family have previous history of blood clots? No   8. Do you or does anyone in your family have a serious bleeding problem such as prolonged bleeding following surgeries or cuts? No   9. Have you ever had problems with anemia or been told to take iron pills? No   10. Have you had any abnormal blood loss such as black, tarry or bloody stools? No   11. Have you ever had a blood transfusion? No   12. Are you willing to have a blood transfusion if it is medically needed before, during, or after your surgery? Yes   13. Have you or any of your relatives ever had problems with anesthesia? No   14. Do you have sleep apnea, excessive snoring or daytime drowsiness? No   15. Do you have any artifical heart valves or other implanted medical devices like a pacemaker, defibrillator, or continuous glucose monitor? No   16. Do you have artificial joints? No   17. Are you allergic to latex? No      Polycythemia vera: States her symptoms are controlled on Hydroxyurea. States she takes 1500 mg on Monday, Wednesday, Friday and 1000 mg the rest of the days. Had CBC, hematocrit tests in October.     Medicare annual wellness visit, subsequent:   Has filled the POA form.   Exercise: She does exercises.She is very active, and goes to gym. States she never had any muscle injury.   Allergy: No allergy noted.   Fall: Has no history of fall.   Hearing/Vision issues: No issues noted.   Anxiety and depression: No issues noted.   Memory concern: Has no memory concerns.   ADLs: He does driving, goes shopping.   Medications: She takes Aspirin 81 mg tablet.   Past surgical history: Has a history of tonsillectomy.   Family history: Her mother had a history of hypertension, heart disease, osteoporosis, and father has a history of heart diseases.   Social history: She does not smoke, drink, or use drugs.   Breast cancer screening: Due for screening. Last scan was done on 11/2022 which was normal.   Bone density screening: Up-to-date with screening. Agrees to do a scan. Her standard deviation score is 2.2. Inquires about strength training for osteopenia. Her DEXA report shows lumbar spine was -1, left hip was -1.9, and neck was -2.3. Her mother has a history of hip fracture this July.   Screening labs: Had labs in October which shows normal reports.     Tinnitus of both ears:Reports continuous humming in the ear. Initially she thought it may be due to blood. She stopped drinking coffee for a week because of fluctuating blood pressure. States her mother had a history of hearing loss, and she wore hearing aids but at the age of 89.      Sleep apnea in adult: Has a history of sleep apnea. On CPAP. She uses a full coverage mask because she has a habit of mouth breathing. States sometimes she sleeps well, and sometimes experiences trouble sleeping. She uses calm marielle for sleep.     Basal cell carcinoma (BCC), unspecified site: Has a  "      Health Care Directive:  Patient does not have a Health Care Directive or Living Will: Discussed advance care planning with patient; however, patient declined at this time.    Preoperative Review of :   reviewed - no record of controlled substances prescribed.      Review of Systems   All other systems reviewed and are negative.    Patient Active Problem List    Diagnosis Date Noted    Snores 09/24/2021     Priority: Medium    Routine general medical examination at a health care facility 02/21/2020     Priority: Medium    BMI 26.0-26.9,adult      Priority: Medium    Pure hypercholesterolemia      Priority: Medium    Vasculogenic erectile dysfunction 10/05/2018     Priority: Medium    Family history of prostate cancer in father 09/15/2017     Priority: Medium      Past Medical History:   Diagnosis Date    Subacute thyroiditis 1/16/2015     Past Surgical History:   Procedure Laterality Date    INSERT CHEST TUBE      PROSTATE BIOPSY      VASECTOMY      WISDOM TOOTH EXTRACTION       Current Outpatient Medications   Medication Sig Dispense Refill    rosuvastatin (CRESTOR) 20 MG tablet Take 1 tablet (20 mg) by mouth daily 90 tablet 3    sildenafil (REVATIO) 20 MG tablet Take 20 mg by mouth as needed         No Known Allergies     Social History     Tobacco Use    Smoking status: Never     Passive exposure: Never    Smokeless tobacco: Never   Substance Use Topics    Alcohol use: Yes     Alcohol/week: 2.0 standard drinks of alcohol     Types: 2 Cans of beer per week       History   Drug Use No         Objective     /85 (BP Location: Left arm, Patient Position: Sitting, Cuff Size: Adult Large)   Pulse 68   Temp 98.3  F (36.8  C) (Oral)   Resp 12   Ht 1.727 m (5' 8\")   Wt 81.7 kg (180 lb 3.2 oz)   SpO2 99%   BMI 27.40 kg/m      Physical Exam  Vitals and nursing note reviewed.   Constitutional:       General: He is not in acute distress.     Appearance: Normal appearance.   HENT:      Head: " Normocephalic and atraumatic.      Right Ear: Tympanic membrane, ear canal and external ear normal.      Left Ear: Tympanic membrane, ear canal and external ear normal.      Nose: Nose normal.      Mouth/Throat:      Mouth: Mucous membranes are moist.      Pharynx: Oropharynx is clear. No oropharyngeal exudate.   Eyes:      General: No scleral icterus.     Extraocular Movements: Extraocular movements intact.      Conjunctiva/sclera: Conjunctivae normal.   Neck:      Vascular: No carotid bruit.   Cardiovascular:      Rate and Rhythm: Normal rate and regular rhythm.      Pulses: Normal pulses.      Heart sounds: Normal heart sounds. No murmur heard.     No friction rub. No gallop.   Pulmonary:      Effort: Pulmonary effort is normal.      Breath sounds: Normal breath sounds. No wheezing, rhonchi or rales.   Musculoskeletal:         General: No swelling.      Cervical back: Normal range of motion.      Right lower leg: No edema.      Left lower leg: No edema.      Comments: Upper upper extremities bilateral with 5/5  strength.  Neurovascularly intact to fingertips.  DTRs 2/4 at bicep and brachial radialis regions.  Capillary refill less than 2 seconds distally   Skin:     General: Skin is warm and dry.      Capillary Refill: Capillary refill takes less than 2 seconds.      Coloration: Skin is not jaundiced.      Findings: No rash.   Neurological:      General: No focal deficit present.      Mental Status: He is alert and oriented to person, place, and time.      Cranial Nerves: No cranial nerve deficit.      Gait: Gait is intact. Gait normal.      Deep Tendon Reflexes:      Reflex Scores:       Bicep reflexes are 2+ on the right side and 2+ on the left side.       Patellar reflexes are 2+ on the right side and 2+ on the left side.  Psychiatric:         Mood and Affect: Mood normal.         Thought Content: Thought content normal.         Recent Labs   Lab Test 03/03/23  1425      POTASSIUM 4.1   CR 1.12     history of basal cell carcinoma. She visits a dermatologist every year.      Medications  Medications were reviewed and updated today.    Histories  I have personally reviewed and updated the patient's past medical, past surgical, family and social histories during today's visit.    Review of Systems    Constitutional: Negative for fever, chills, weight changes, and fatigue.   Eye: Negative for eye issues.   ENT: Negative for nose, and throat issues. Positive for tinnitus.   Cardiovascular: Negative for chest pain, palpitations and leg swelling.   Respiratory: Negative for shortness of breath, cough and wheezing.   Gastrointestinal: Negative for nausea, vomiting, diarrhea, and constipation.  Genitourinary: Negative for urinary issue.  Neurological: Negative for lightheadedness, dizziness, headache, and weakness.  Hematological: Negative for bleeding and bruising.  Musculoskeletal: Negative for joint pain, muscle pain.  Psychiatric: Negative for anxiety, depression. Positive for sleep issue.   Skin: Negative for skin issues.    Objective   Visit Vitals  /64 (BP Location: LUE - Left upper extremity, Patient Position: Sitting, Cuff Size: Regular)   Pulse 71   Temp 97.9 °F (36.6 °C) (Oral)   Resp 16   Ht 5' 2.75\" (1.594 m)   Wt 62.5 kg (137 lb 10.8 oz)   SpO2 96%   BMI 24.58 kg/m²     Physical Exam     Constitutional: Alert, in no acute distress.  Eyes: No discharge, normal conjunctiva, no eyelid swelling, no ptosis, and the sclerae were normal. Pupils equal, round and reactive to light and accommodation, conjugate gaze, and extraocular movements were intact.  ENT: Normal appearing outer ear, normal appearing nose. Examination of the tympanic membrane showed normal landmarks, normal appearing external canal. Nasal mucosa moist and pink, no nasal discharge. Normal lips. Oral mucosa pink and moist, no oral lesions.  Neck: Normal appearing, supple neck, and no mass was seen. Thyroid not enlarged and no thyroid      Diagnostics:  Recent Results (from the past 48 hour(s))   EKG 12-lead, tracing only    Collection Time: 10/25/23  3:00 PM   Result Value Ref Range    Systolic Blood Pressure 128 mmHg    Diastolic Blood Pressure 85 mmHg    Ventricular Rate 63 BPM    Atrial Rate 63 BPM    KS Interval 158 ms    QRS Duration 82 ms     ms    QTc 409 ms    P Axis 28 degrees    R AXIS 28 degrees    T Axis 16 degrees    Interpretation ECG       Sinus rhythm  Normal ECG  No previous ECGs available  Confirmed by MARII VICTORIA MD LOC: ([id number]) on 10/25/2023 3:31:53 PM     Basic metabolic panel  (Ca, Cl, CO2, Creat, Gluc, K, Na, BUN)    Collection Time: 10/25/23  3:41 PM   Result Value Ref Range    Sodium 139 135 - 145 mmol/L    Potassium 4.4 3.4 - 5.3 mmol/L    Chloride 105 98 - 107 mmol/L    Carbon Dioxide (CO2) 22 22 - 29 mmol/L    Anion Gap 12 7 - 15 mmol/L    Urea Nitrogen 20.4 8.0 - 23.0 mg/dL    Creatinine 1.00 0.67 - 1.17 mg/dL    GFR Estimate 85 >60 mL/min/1.73m2    Calcium 9.1 8.8 - 10.2 mg/dL    Glucose 96 70 - 99 mg/dL   Hemoglobin with Reflex to Iron Studies    Collection Time: 10/25/23  3:41 PM   Result Value Ref Range    Hemoglobin 13.8 13.3 - 17.7 g/dL   Extra Green Top (Lithium Heparin) Tube    Collection Time: 10/25/23  3:41 PM   Result Value Ref Range    Hold Specimen JIC         Revised Cardiac Risk Index (RCRI):  The patient has the following serious cardiovascular risks for perioperative complications:   - No serious cardiac risks = 0 points     RCRI Interpretation: 0 points: Class I (very low risk - 0.4% complication rate)         Signed Electronically by: Kevyn Porter DO  Copy of this evaluation report is provided to requesting physician.       nodules. No lymphadenopathy.  Pulmonary: No respiratory distress, normal respiratory rate and effort, and no accessory muscle use. Breath sounds clear to auscultation bilaterally.  Cardiovascular: Normal rate, no murmurs, regular rhythm, normal S1, and normal S2. Edema was not present in the lower extremities. Carotid pulses were normal bilaterally.  Abdomen: Soft, non-tender, non-distended, normal bowel sounds, and no abdominal mass. No hepatomegaly and no splenomegaly. No umbilical hernia was seen.  Musculoskeletal: Normal gait. No musculoskeletal erythema was seen, no joint swelling seen, and no joint tenderness was elicited. No scoliosis. Normal range of motion. There was no joint instability noted. Muscle strength and tone were normal.  Neurologic: Cranial nerves grossly intact. Normal DTRs. No sensory deficits noted. No coordination deficits. Normal gait. Muscle strength and tone were normal.  Psychiatric: Oriented to person, place, and time. Interactive and mood/affect were appropriate. Judgment not impaired. Normal attention span. Short term memory intact.  Skin, Hair, Nails: Normal skin color and pigmentation and no rash. Normal skin turgor. No clubbing or cyanosis of the fingernails.    Laboratory  I have reviewed the pertinent laboratory tests. These are the pertinent findings:  · Labs ordered CMP lipids    Imaging  I have reviewed the pertinent imaging study reports. These are the pertinent findings:  · mammogram    Assessment & Plan   Diagnoses and associated orders for this visit:  1. Hypercholesteremia  -     Comprehensive Metabolic Panel  -     Lipid Panel With Reflex  2. Polycythemia vera (CMD)  3. Medicare annual wellness visit, subsequent  4. Tinnitus of both ears  -     SERVICE TO ENT  5. Sleep apnea in adult  6. Basal cell carcinoma (BCC), unspecified site    Hypercholesterolemia:   slightly elevated.   Medications reviewed and reconciled.   Ordered comprehensive Metabolic Panel, lipid Panel  With Reflex. Refer to orders.   Continue current medications.     Polycythemia vera:  Stable on current medications.   Medications reviewed and reconciled.   Hydroxyurea 500 mg capsule; 1500 mg Mon Wed Fri and 1000 mg rest of the days.  Continue current medications.     Medicare annual wellness visit, subsequent:  Medicare wellness paperwork reviewed and discussed.  Health maintenance and preventive plan reviewed today.  All age appropriate screenings were reviewed and discussed.  Medications  reviewed and discussed.   Past medical, surgical, family, social history and allergies reviewed and discussed.  Advised to send the copy of POA.   Discussed the weight bearing exercises for strength.   Recommended taking Calcium, vitamin D supplements.   Advised to repeat DEXA scan as advised.   Recommended doing mammogram but patient declines at this time.  Advised to take shingles vaccine at the local pharmacy.     Tinnitus of both ears:   Maybe a sign of hearing loss.   Referred to ENT.   Informed humming is a sign of hearing loss.   Recommended visiting ENT specialist for ringing ears.     Sleep apnea in adult:  Continue current management.     Basal cell carcinoma (BCC), unspecified site:  Continue current management.   Advised to inform dermatologist to send the notes   Recommended following dermatologist.     Follow up in a year or sooner as needed.     Refer to orders.  Medical compliance with plan discussed and risks of non-compliance reviewed.  Patient education completed on disease process, etiology & prognosis.  Proper usage and side effects of medications reviewed & discussed.  Return to clinic as clinically indicated as discussed with patient who verbalized understanding of the plan and is in agreement with the plan.    IMoncho, have created a visit summary document based on the audio recording between Jules Bentley MD and this patient for the physician to review, edit as needed, and authenticate.   Creation Date: 11/23/2023    I have reviewed and edited the visit summary above and attest that it is accurate.

## 2024-02-02 ENCOUNTER — PATIENT OUTREACH (OUTPATIENT)
Dept: CARE COORDINATION | Facility: CLINIC | Age: 64
End: 2024-02-02
Payer: COMMERCIAL

## 2024-02-16 ENCOUNTER — PATIENT OUTREACH (OUTPATIENT)
Dept: CARE COORDINATION | Facility: CLINIC | Age: 64
End: 2024-02-16
Payer: COMMERCIAL

## 2024-04-21 ENCOUNTER — HEALTH MAINTENANCE LETTER (OUTPATIENT)
Age: 64
End: 2024-04-21

## 2024-04-23 DIAGNOSIS — E78.00 PURE HYPERCHOLESTEROLEMIA: ICD-10-CM

## 2024-04-23 RX ORDER — ROSUVASTATIN CALCIUM 20 MG/1
20 TABLET, COATED ORAL DAILY
Qty: 90 TABLET | Refills: 0 | Status: SHIPPED | OUTPATIENT
Start: 2024-04-23

## 2024-04-24 NOTE — TELEPHONE ENCOUNTER
Left message to call back for: patient x1  Information to relay to patient: Schedule follow-up with in the next 90-days

## 2025-05-11 ENCOUNTER — HEALTH MAINTENANCE LETTER (OUTPATIENT)
Age: 65
End: 2025-05-11

## 2025-06-22 ENCOUNTER — HEALTH MAINTENANCE LETTER (OUTPATIENT)
Age: 65
End: 2025-06-22